# Patient Record
Sex: MALE | Race: WHITE | NOT HISPANIC OR LATINO | Employment: OTHER | URBAN - METROPOLITAN AREA
[De-identification: names, ages, dates, MRNs, and addresses within clinical notes are randomized per-mention and may not be internally consistent; named-entity substitution may affect disease eponyms.]

---

## 2017-02-14 ENCOUNTER — CLINICAL SUPPORT NO REQUIREMENTS (OUTPATIENT)
Dept: CARDIOLOGY | Facility: CLINIC | Age: 72
End: 2017-02-14

## 2017-02-14 DIAGNOSIS — I44.2 ATRIOVENTRICULAR BLOCK, COMPLETE (HCC): Primary | ICD-10-CM

## 2017-02-14 PROCEDURE — 93280 PM DEVICE PROGR EVAL DUAL: CPT | Performed by: INTERNAL MEDICINE

## 2017-04-02 DIAGNOSIS — I10 ESSENTIAL HYPERTENSION: ICD-10-CM

## 2017-04-03 RX ORDER — ATENOLOL 25 MG/1
TABLET ORAL
Qty: 30 TABLET | Refills: 0 | Status: SHIPPED | OUTPATIENT
Start: 2017-04-03 | End: 2017-05-05 | Stop reason: SDUPTHER

## 2017-05-05 DIAGNOSIS — I10 ESSENTIAL HYPERTENSION: ICD-10-CM

## 2017-05-05 RX ORDER — ATENOLOL 25 MG/1
TABLET ORAL
Qty: 30 TABLET | Refills: 0 | Status: SHIPPED | OUTPATIENT
Start: 2017-05-05 | End: 2017-05-09 | Stop reason: SDUPTHER

## 2017-05-08 ENCOUNTER — TELEPHONE (OUTPATIENT)
Dept: CARDIOLOGY | Facility: CLINIC | Age: 72
End: 2017-05-08

## 2017-05-09 RX ORDER — ATENOLOL 25 MG/1
TABLET ORAL
Qty: 30 TABLET | Refills: 0 | Status: SHIPPED | OUTPATIENT
Start: 2017-05-09 | End: 2017-05-30 | Stop reason: SDUPTHER

## 2017-05-18 ENCOUNTER — CLINICAL SUPPORT NO REQUIREMENTS (OUTPATIENT)
Dept: CARDIOLOGY | Facility: CLINIC | Age: 72
End: 2017-05-18

## 2017-05-18 DIAGNOSIS — I44.2 ATRIOVENTRICULAR BLOCK, COMPLETE (HCC): Primary | ICD-10-CM

## 2017-05-18 PROCEDURE — 93293 PM PHONE R-STRIP DEVICE EVAL: CPT | Performed by: INTERNAL MEDICINE

## 2017-05-30 ENCOUNTER — OFFICE VISIT (OUTPATIENT)
Dept: CARDIOLOGY | Facility: CLINIC | Age: 72
End: 2017-05-30

## 2017-05-30 VITALS
BODY MASS INDEX: 33.72 KG/M2 | DIASTOLIC BLOOD PRESSURE: 82 MMHG | WEIGHT: 249 LBS | HEIGHT: 72 IN | HEART RATE: 60 BPM | SYSTOLIC BLOOD PRESSURE: 136 MMHG

## 2017-05-30 DIAGNOSIS — I10 ESSENTIAL HYPERTENSION: ICD-10-CM

## 2017-05-30 DIAGNOSIS — R06.09 DYSPNEA ON EXERTION: ICD-10-CM

## 2017-05-30 DIAGNOSIS — I44.2 COMPLETE ATRIOVENTRICULAR BLOCK (HCC): Primary | ICD-10-CM

## 2017-05-30 PROCEDURE — 99213 OFFICE O/P EST LOW 20 MIN: CPT | Performed by: INTERNAL MEDICINE

## 2017-05-30 PROCEDURE — 93000 ELECTROCARDIOGRAM COMPLETE: CPT | Performed by: INTERNAL MEDICINE

## 2017-05-30 RX ORDER — ATENOLOL 25 MG/1
TABLET ORAL
Qty: 90 TABLET | Refills: 3 | Status: SHIPPED | OUTPATIENT
Start: 2017-05-30 | End: 2018-05-22 | Stop reason: SDUPTHER

## 2017-08-29 ENCOUNTER — CLINICAL SUPPORT NO REQUIREMENTS (OUTPATIENT)
Dept: CARDIOLOGY | Facility: CLINIC | Age: 72
End: 2017-08-29

## 2017-08-29 DIAGNOSIS — I44.2 ATRIOVENTRICULAR BLOCK, COMPLETE (HCC): Primary | ICD-10-CM

## 2017-08-29 PROCEDURE — 93280 PM DEVICE PROGR EVAL DUAL: CPT | Performed by: INTERNAL MEDICINE

## 2017-09-10 ENCOUNTER — HOSPITAL ENCOUNTER (EMERGENCY)
Facility: HOSPITAL | Age: 72
Discharge: HOME | End: 2017-09-10
Attending: EMERGENCY MEDICINE | Admitting: EMERGENCY MEDICINE

## 2017-09-10 ENCOUNTER — APPOINTMENT (OUTPATIENT)
Dept: GENERAL RADIOLOGY | Facility: HOSPITAL | Age: 72
End: 2017-09-10

## 2017-09-10 ENCOUNTER — TRANSCRIBE ORDERS (OUTPATIENT)
Dept: ADMINISTRATIVE | Facility: HOSPITAL | Age: 72
End: 2017-09-10

## 2017-09-10 VITALS
HEART RATE: 72 BPM | TEMPERATURE: 98.6 F | RESPIRATION RATE: 14 BRPM | HEIGHT: 72 IN | OXYGEN SATURATION: 95 % | SYSTOLIC BLOOD PRESSURE: 164 MMHG | WEIGHT: 230 LBS | DIASTOLIC BLOOD PRESSURE: 88 MMHG | BODY MASS INDEX: 31.15 KG/M2

## 2017-09-10 DIAGNOSIS — R11.0 NAUSEA: ICD-10-CM

## 2017-09-10 DIAGNOSIS — K80.50 BILIARY COLIC: Primary | ICD-10-CM

## 2017-09-10 LAB
ALBUMIN SERPL-MCNC: 4.2 G/DL (ref 3.5–5.2)
ALBUMIN/GLOB SERPL: 1.2 G/DL
ALP SERPL-CCNC: 49 U/L (ref 40–129)
ALT SERPL W P-5'-P-CCNC: 18 U/L (ref 5–41)
ANION GAP SERPL CALCULATED.3IONS-SCNC: 15.6 MMOL/L
AST SERPL-CCNC: 34 U/L (ref 5–40)
BACTERIA UR QL AUTO: ABNORMAL /HPF
BASOPHILS # BLD AUTO: 0.09 10*3/MM3 (ref 0–0.2)
BASOPHILS NFR BLD AUTO: 0.8 % (ref 0–2)
BILIRUB SERPL-MCNC: 1.2 MG/DL (ref 0.2–1.2)
BILIRUB UR QL STRIP: NEGATIVE
BUN BLD-MCNC: 28 MG/DL (ref 8–23)
BUN/CREAT SERPL: 28.6 (ref 7–25)
CALCIUM SPEC-SCNC: 9.3 MG/DL (ref 8.8–10.5)
CHLORIDE SERPL-SCNC: 93 MMOL/L (ref 98–107)
CLARITY UR: CLEAR
CO2 SERPL-SCNC: 26.4 MMOL/L (ref 22–29)
COLOR UR: YELLOW
CREAT BLD-MCNC: 0.98 MG/DL (ref 0.76–1.27)
DEPRECATED RDW RBC AUTO: 44.1 FL (ref 37–54)
EOSINOPHIL # BLD AUTO: 0.06 10*3/MM3 (ref 0.1–0.3)
EOSINOPHIL NFR BLD AUTO: 0.5 % (ref 0–4)
ERYTHROCYTE [DISTWIDTH] IN BLOOD BY AUTOMATED COUNT: 13.2 % (ref 11.5–14.5)
GFR SERPL CREATININE-BSD FRML MDRD: 75 ML/MIN/1.73
GLOBULIN UR ELPH-MCNC: 3.5 GM/DL
GLUCOSE BLD-MCNC: 98 MG/DL (ref 65–99)
GLUCOSE UR STRIP-MCNC: NEGATIVE MG/DL
HCT VFR BLD AUTO: 53.8 % (ref 42–52)
HGB BLD-MCNC: 18.3 G/DL (ref 14–18)
HGB UR QL STRIP.AUTO: ABNORMAL
HYALINE CASTS UR QL AUTO: ABNORMAL /LPF
IMM GRANULOCYTES # BLD: 0.06 10*3/MM3 (ref 0–0.03)
IMM GRANULOCYTES NFR BLD: 0.5 % (ref 0–0.5)
KETONES UR QL STRIP: ABNORMAL
LEUKOCYTE ESTERASE UR QL STRIP.AUTO: NEGATIVE
LIPASE SERPL-CCNC: 54 U/L (ref 13–60)
LYMPHOCYTES # BLD AUTO: 2.56 10*3/MM3 (ref 0.6–4.8)
LYMPHOCYTES NFR BLD AUTO: 21.6 % (ref 20–45)
MCH RBC QN AUTO: 30.7 PG (ref 27–31)
MCHC RBC AUTO-ENTMCNC: 34 G/DL (ref 31–37)
MCV RBC AUTO: 90.3 FL (ref 80–94)
MONOCYTES # BLD AUTO: 1.2 10*3/MM3 (ref 0–1)
MONOCYTES NFR BLD AUTO: 10.1 % (ref 3–8)
NEUTROPHILS # BLD AUTO: 7.9 10*3/MM3 (ref 1.5–8.3)
NEUTROPHILS NFR BLD AUTO: 66.5 % (ref 45–70)
NITRITE UR QL STRIP: NEGATIVE
NRBC BLD MANUAL-RTO: 0 /100 WBC (ref 0–0)
PH UR STRIP.AUTO: 5.5 [PH] (ref 4.5–8)
PLATELET # BLD AUTO: 262 10*3/MM3 (ref 140–500)
PMV BLD AUTO: 10 FL (ref 7.4–10.4)
POTASSIUM BLD-SCNC: 4 MMOL/L (ref 3.5–5.2)
PROT SERPL-MCNC: 7.7 G/DL (ref 6–8.5)
PROT UR QL STRIP: NEGATIVE
RBC # BLD AUTO: 5.96 10*6/MM3 (ref 4.7–6.1)
RBC # UR: ABNORMAL /HPF
REF LAB TEST METHOD: ABNORMAL
SODIUM BLD-SCNC: 135 MMOL/L (ref 136–145)
SP GR UR STRIP: <=1.005 (ref 1–1.03)
SQUAMOUS #/AREA URNS HPF: ABNORMAL /HPF
TROPONIN T SERPL-MCNC: <0.01 NG/ML (ref 0–0.03)
UROBILINOGEN UR QL STRIP: ABNORMAL
WBC NRBC COR # BLD: 11.87 10*3/MM3 (ref 4.8–10.8)
WBC UR QL AUTO: ABNORMAL /HPF

## 2017-09-10 PROCEDURE — 96374 THER/PROPH/DIAG INJ IV PUSH: CPT

## 2017-09-10 PROCEDURE — 85025 COMPLETE CBC W/AUTO DIFF WBC: CPT | Performed by: EMERGENCY MEDICINE

## 2017-09-10 PROCEDURE — 99284 EMERGENCY DEPT VISIT MOD MDM: CPT

## 2017-09-10 PROCEDURE — 25010000002 ONDANSETRON PER 1 MG: Performed by: EMERGENCY MEDICINE

## 2017-09-10 PROCEDURE — 99284 EMERGENCY DEPT VISIT MOD MDM: CPT | Performed by: EMERGENCY MEDICINE

## 2017-09-10 PROCEDURE — 81001 URINALYSIS AUTO W/SCOPE: CPT | Performed by: EMERGENCY MEDICINE

## 2017-09-10 PROCEDURE — 83690 ASSAY OF LIPASE: CPT | Performed by: EMERGENCY MEDICINE

## 2017-09-10 PROCEDURE — 80053 COMPREHEN METABOLIC PANEL: CPT | Performed by: EMERGENCY MEDICINE

## 2017-09-10 PROCEDURE — 71010 HC CHEST PA OR AP: CPT

## 2017-09-10 PROCEDURE — 93005 ELECTROCARDIOGRAM TRACING: CPT | Performed by: EMERGENCY MEDICINE

## 2017-09-10 PROCEDURE — 96361 HYDRATE IV INFUSION ADD-ON: CPT

## 2017-09-10 PROCEDURE — 93010 ELECTROCARDIOGRAM REPORT: CPT | Performed by: INTERNAL MEDICINE

## 2017-09-10 PROCEDURE — 84484 ASSAY OF TROPONIN QUANT: CPT | Performed by: EMERGENCY MEDICINE

## 2017-09-10 RX ORDER — ASPIRIN 81 MG/1
81 TABLET, CHEWABLE ORAL DAILY
COMMUNITY

## 2017-09-10 RX ORDER — ONDANSETRON 2 MG/ML
4 INJECTION INTRAMUSCULAR; INTRAVENOUS ONCE
Status: COMPLETED | OUTPATIENT
Start: 2017-09-10 | End: 2017-09-10

## 2017-09-10 RX ORDER — ONDANSETRON 8 MG/1
8 TABLET, ORALLY DISINTEGRATING ORAL EVERY 8 HOURS PRN
Qty: 10 TABLET | Refills: 0 | Status: SHIPPED | OUTPATIENT
Start: 2017-09-10 | End: 2018-05-22 | Stop reason: ALTCHOICE

## 2017-09-10 RX ORDER — SUCRALFATE 1 G/1
1 TABLET ORAL ONCE
Status: COMPLETED | OUTPATIENT
Start: 2017-09-10 | End: 2017-09-10

## 2017-09-10 RX ORDER — SUCRALFATE ORAL 1 G/10ML
1 SUSPENSION ORAL 4 TIMES DAILY
Qty: 420 ML | Refills: 0 | Status: SHIPPED | OUTPATIENT
Start: 2017-09-10 | End: 2018-05-22 | Stop reason: ALTCHOICE

## 2017-09-10 RX ORDER — PROMETHAZINE HYDROCHLORIDE 25 MG/1
25 TABLET ORAL EVERY 6 HOURS PRN
COMMUNITY
End: 2018-05-22 | Stop reason: ALTCHOICE

## 2017-09-10 RX ADMIN — SODIUM CHLORIDE 1000 ML: 9 INJECTION, SOLUTION INTRAVENOUS at 21:21

## 2017-09-10 RX ADMIN — ONDANSETRON 4 MG: 2 INJECTION, SOLUTION INTRAMUSCULAR; INTRAVENOUS at 21:21

## 2017-09-10 RX ADMIN — SUCRALFATE 1 G: 1 TABLET ORAL at 23:46

## 2017-09-11 ENCOUNTER — APPOINTMENT (OUTPATIENT)
Dept: ULTRASOUND IMAGING | Facility: HOSPITAL | Age: 72
End: 2017-09-11
Attending: EMERGENCY MEDICINE

## 2017-09-11 NOTE — DISCHARGE INSTRUCTIONS
Avoid spicy and fatty foods.  Be here at 11:30 tomorrow morning for a gallbladder ultrasound at noon.  The results will be forwarded to Dr. Lynch.  Follow-up with physician from the physician referral list for primary care provider to follow-up with within one week.. Return to emergency department if there is increasing pain, vomiting, vomiting blood, fever, chills, worsen anyway at all.

## 2017-09-11 NOTE — ED PROVIDER NOTES
Subjective   History of Present Illness  History of Present Illness    Chief complaint: Nausea    Location: Home    Quality/Severity:  Moderate, vomiting ×1 after taking a pain pill.  The emesis was nonbloody and nonbilious.    Timing/Onset/Duration: Acute onset 48 hours ago    Modifying Factors: Nothing seems to make it better or worse    Associated Symptoms: No headache.  No fever chills or cough.  No sore throat earache or nasal congestion.  No chest pain or shortness of breath.  No abdominal pain.  No diarrhea or burning when he urinates.    Narrative: This 71-year-old white male presents with nausea for last 48 hours.  The patient states the nausea started Friday night after eating a steak, baked potato, Mandy Valdovinos, and bladder.  He patient denies any headache.  No fever chills or cough.  No sore throat earache or nasal condition.  No chest pain or shortness of breath.  The patient vomited once after taking a pain pill for his chronic back pain.  There is been no blood in the vomit and it is been nonbilious.  There is no point diarrhea or burning when he urinates.  The patient still has her gallbladder.  The patient has been complaining of intermittent right upper quadrant tenderness after he eats.    PCP:  Mi      Review of Systems   Constitutional: Negative for chills and fever.   HENT: Negative for ear pain and sore throat.    Eyes: Negative for discharge and redness.   Respiratory: Negative for cough, chest tightness, shortness of breath, wheezing and stridor.    Cardiovascular: Negative for chest pain, palpitations and leg swelling.   Gastrointestinal: Positive for nausea and vomiting. Negative for abdominal pain, blood in stool, constipation and diarrhea.   Genitourinary: Negative for decreased urine volume, dysuria, flank pain, frequency, hematuria and urgency.   Musculoskeletal: Positive for back pain ( chronic). Negative for arthralgias, neck pain and neck stiffness.   Skin: Negative for rash.    Neurological: Negative for dizziness, speech difficulty, weakness, light-headedness, numbness and headaches.   Hematological: Negative for adenopathy.   Psychiatric/Behavioral: Negative.  Negative for agitation and confusion.        Medication List      ASK your doctor about these medications          aspirin 81 MG chewable tablet       atenolol 25 MG tablet   Commonly known as:  TENORMIN   Take one tablet daily.       Hydrocodone-Acetaminophen  MG per tablet   Commonly known as:  LORTAB        promethazine 25 MG tablet   Commonly known as:  PHENERGAN           Past Medical History:   Diagnosis Date   • Chronic anxiety    • Chronic lower back pain    • Complete atrioventricular block     s/p dual chamber pacemaker   • Gout    • Hypertension        Allergies   Allergen Reactions   • No Known Drug Allergy        Past Surgical History:   Procedure Laterality Date   • INSERT / REPLACE / REMOVE PACEMAKER     • PACEMAKER IMPLANTATION      [employer] dual chamber, 11/2011       History reviewed. No pertinent family history.    Social History     Social History   • Marital status:      Spouse name: N/A   • Number of children: N/A   • Years of education: N/A     Social History Main Topics   • Smoking status: Former Smoker     Start date: 5/30/1997   • Smokeless tobacco: Never Used      Comment: CAFFEINE USE/ ONE CUP IN THE MORNING.    • Alcohol use Yes      Comment: SOCIAL USE    • Drug use: No   • Sexual activity: Not Asked     Other Topics Concern   • None     Social History Narrative           Objective   Physical Exam   Constitutional: He is oriented to person, place, and time. He appears well-developed and well-nourished. No distress.   ED Triage Vitals:  Temp: 98.6 °F (37 °C) (09/10/17 1846)  Heart Rate: 84 (09/10/17 1846)  Resp: 20 (09/10/17 1846)  BP: 111/80 (09/10/17 1846)  SpO2: 97 % (09/10/17 1846)  Temp src: Oral (09/10/17 1846)  Heart Rate Source: Monitor (09/10/17 2126)  Patient Position: Lying  (09/10/17 2126)  BP Location: Right arm (09/10/17 2126)  FiO2 (%): n/a    The patient's vitals were reviewed by me.  Unless otherwise noted they are within normal limits.     HENT:   Head: Normocephalic and atraumatic.   Right Ear: External ear normal.   Left Ear: External ear normal.   Nose: Nose normal.   Mouth/Throat: Oropharynx is clear and moist.   Eyes: Conjunctivae and EOM are normal. Pupils are equal, round, and reactive to light. Right eye exhibits no discharge. Left eye exhibits no discharge. No scleral icterus.   Neck: Normal range of motion. Neck supple. No JVD present. No tracheal deviation present. No thyromegaly present.   Cardiovascular: Normal rate, regular rhythm, normal heart sounds and intact distal pulses.  Exam reveals no gallop and no friction rub.    No murmur heard.  Pulmonary/Chest: Effort normal and breath sounds normal. No stridor. No respiratory distress. He has no wheezes. He has no rales. He exhibits no tenderness.   Abdominal: Soft. Bowel sounds are normal. He exhibits no distension and no mass. There is no tenderness. There is no rebound and no guarding. No hernia.   Musculoskeletal: Normal range of motion. He exhibits no edema or deformity.   Lymphadenopathy:     He has no cervical adenopathy.   Neurological: He is alert and oriented to person, place, and time.   Skin: Skin is warm and dry. No rash noted. He is not diaphoretic. No erythema. No pallor.   Psychiatric: His behavior is normal.   Nursing note and vitals reviewed.      Procedures         ED Course  ED Course   Comment By Time   Laboratory values were reviewed by me.  Urine microscopic shows 0-2 RBCs.  The white blood cell count is 11.8.  The hemoglobin is 18.  The hematocrit is 53.  The BUN is 28.  The sodium is 135.  The chloride is 93.  The lab for values are otherwise unremarkable. Panchito Bae MD 09/10 2316      11:18 PM, 09/10/17:  The EKG was obtained at 20/1/17.  The EKG was read by me at 20/1/18.  EKG shows  an atrial sensed ventricular paced complexes.  The rate is 72.  There is no other acute abnormality.The EKG tonight was compared to an EKG dated May 30, 2017.  The EKG is essentially unchanged.    11:27 PM, 09/10/17:  The patient was reassessed.  He feels better.  His vital signs were reviewed and are stable.  Abdominal exam: Soft nontender no masses positive bowel sounds.  The patient tolerated clear liquids.    11:27 PM, 09/10/17:  The patient's diagnosis of nausea and biliary colic was discussed with him.  The patient has been advised to eat a non-fat non-spicy diet.  We will obtain a gallbladder ultrasound on the patient tomorrow with the results forwarded to Dr. Lynch.  Patient has been advised to follow-up with Dr. Mcpherson this week after gallbladder ultrasound.  He is requesting to have a new primary care provider.  We will give him a list of primary care providers that he can follow-up with within the next week.  All the patient's questions were answered patient will be discharged in good condition.        MDM  XR Chest 1 View   ED Interpretation   The chest x-ray was compared to previously reviewed by me.  There   is no active disease.        Labs Reviewed   COMPREHENSIVE METABOLIC PANEL - Abnormal; Notable for the following:        Result Value    BUN 28 (*)     Sodium 135 (*)     Chloride 93 (*)     BUN/Creatinine Ratio 28.6 (*)     All other components within normal limits    Narrative:     The MDRD GFR formula is only valid for adults with stable renal function between ages 18 and 70.   URINALYSIS W/ CULTURE IF INDICATED - Abnormal; Notable for the following:     Ketones, UA Trace (*)     Blood, UA Trace (*)     All other components within normal limits   CBC WITH AUTO DIFFERENTIAL - Abnormal; Notable for the following:     WBC 11.87 (*)     Hemoglobin 18.3 (*)     Hematocrit 53.8 (*)     Monocyte % 10.1 (*)     Monocytes, Absolute 1.20 (*)     Eosinophils, Absolute 0.06 (*)     Immature Grans, Absolute  0.06 (*)     All other components within normal limits   URINALYSIS, MICROSCOPIC ONLY - Abnormal; Notable for the following:     RBC, UA 0-2 (*)     All other components within normal limits   TROPONIN (IN-HOUSE) - Normal    Narrative:     Troponin T Reference Ranges:  Less than 0.03 ng/mL:    Negative for AMI  0.03 to 0.09 ng/mL:      Indeterminant for AMI  Greater than 0.09 ng/mL: Positive for AMI   LIPASE - Normal   CBC AND DIFFERENTIAL    Narrative:     The following orders were created for panel order CBC & Differential.  Procedure                               Abnormality         Status                     ---------                               -----------         ------                     CBC Auto Differential[[id number]]        Abnormal            Final result                 Please view results for these tests on the individual orders.         Final diagnoses:   None         ED Medications:  Medications   ondansetron (ZOFRAN) injection 4 mg (4 mg Intravenous Given 9/10/17 2121)   sodium chloride 0.9 % bolus 1,000 mL (0 mL Intravenous Stopped 9/10/17 2219)       New Medications:     Medication List      ASK your doctor about these medications          aspirin 81 MG chewable tablet       atenolol 25 MG tablet   Commonly known as:  TENORMIN   Take one tablet daily.       Hydrocodone-Acetaminophen  MG per tablet   Commonly known as:  LORTAB        promethazine 25 MG tablet   Commonly known as:  PHENERGAN           Stopped Medications:     Medication List      ASK your doctor about these medications          aspirin 81 MG chewable tablet       atenolol 25 MG tablet   Commonly known as:  TENORMIN   Take one tablet daily.       Hydrocodone-Acetaminophen  MG per tablet   Commonly known as:  LORTAB        promethazine 25 MG tablet   Commonly known as:  PHENERGAN             Final diagnoses:   Biliary colic   Nausea            Panchito Bae MD  09/10/17 9302

## 2017-09-14 ENCOUNTER — HOSPITAL ENCOUNTER (OUTPATIENT)
Dept: ULTRASOUND IMAGING | Facility: HOSPITAL | Age: 72
Discharge: HOME | End: 2017-09-14
Attending: EMERGENCY MEDICINE | Admitting: EMERGENCY MEDICINE

## 2017-09-14 PROCEDURE — 76705 ECHO EXAM OF ABDOMEN: CPT

## 2017-10-10 ENCOUNTER — TRANSCRIBE ORDERS (OUTPATIENT)
Dept: ADMINISTRATIVE | Facility: HOSPITAL | Age: 72
End: 2017-10-10

## 2017-10-10 DIAGNOSIS — R10.11 RUQ ABDOMINAL PAIN: Primary | ICD-10-CM

## 2017-10-12 ENCOUNTER — HOSPITAL ENCOUNTER (OUTPATIENT)
Dept: NUCLEAR MEDICINE | Facility: HOSPITAL | Age: 72
Discharge: HOME | End: 2017-10-12

## 2017-10-12 DIAGNOSIS — R10.11 RUQ ABDOMINAL PAIN: ICD-10-CM

## 2017-10-12 PROCEDURE — 78227 HEPATOBIL SYST IMAGE W/DRUG: CPT

## 2017-10-12 PROCEDURE — 0 TECHNETIUM TC 99M MEBROFENIN KIT: Performed by: FAMILY MEDICINE

## 2017-10-12 PROCEDURE — A9537 TC99M MEBROFENIN: HCPCS | Performed by: FAMILY MEDICINE

## 2017-10-12 PROCEDURE — 25010000002 SINCALIDE PER 5 MCG: Performed by: FAMILY MEDICINE

## 2017-10-12 RX ORDER — KIT FOR THE PREPARATION OF TECHNETIUM TC 99M MEBROFENIN 45 MG/10ML
1 INJECTION, POWDER, LYOPHILIZED, FOR SOLUTION INTRAVENOUS
Status: COMPLETED | OUTPATIENT
Start: 2017-10-12 | End: 2017-10-12

## 2017-10-12 RX ADMIN — MEBROFENIN 1 DOSE: 45 INJECTION, POWDER, LYOPHILIZED, FOR SOLUTION INTRAVENOUS at 13:45

## 2017-10-12 RX ADMIN — SINCALIDE 2.2 MCG: 5 INJECTION, POWDER, LYOPHILIZED, FOR SOLUTION INTRAVENOUS at 14:30

## 2017-11-30 ENCOUNTER — CLINICAL SUPPORT NO REQUIREMENTS (OUTPATIENT)
Dept: CARDIOLOGY | Facility: CLINIC | Age: 72
End: 2017-11-30

## 2017-11-30 DIAGNOSIS — I44.2 ATRIOVENTRICULAR BLOCK, COMPLETE (HCC): Primary | ICD-10-CM

## 2017-11-30 PROCEDURE — 93293 PM PHONE R-STRIP DEVICE EVAL: CPT | Performed by: INTERNAL MEDICINE

## 2018-01-09 ENCOUNTER — HOSPITAL ENCOUNTER (OUTPATIENT)
Dept: GENERAL RADIOLOGY | Facility: HOSPITAL | Age: 73
Discharge: HOME | End: 2018-01-09
Admitting: PHYSICIAN ASSISTANT

## 2018-01-09 ENCOUNTER — HOSPITAL ENCOUNTER (OUTPATIENT)
Dept: GENERAL RADIOLOGY | Facility: HOSPITAL | Age: 73
Discharge: HOME | End: 2018-01-09

## 2018-01-09 ENCOUNTER — TRANSCRIBE ORDERS (OUTPATIENT)
Dept: ADMINISTRATIVE | Facility: HOSPITAL | Age: 73
End: 2018-01-09

## 2018-01-09 DIAGNOSIS — M25.551 PAIN OF BOTH HIP JOINTS: ICD-10-CM

## 2018-01-09 DIAGNOSIS — M51.36 DDD (DEGENERATIVE DISC DISEASE), LUMBAR: ICD-10-CM

## 2018-01-09 DIAGNOSIS — M25.552 PAIN OF BOTH HIP JOINTS: Primary | ICD-10-CM

## 2018-01-09 DIAGNOSIS — M25.552 PAIN OF BOTH HIP JOINTS: ICD-10-CM

## 2018-01-09 DIAGNOSIS — M25.551 PAIN OF BOTH HIP JOINTS: Primary | ICD-10-CM

## 2018-01-09 PROCEDURE — 73522 X-RAY EXAM HIPS BI 3-4 VIEWS: CPT

## 2018-01-09 PROCEDURE — 72114 X-RAY EXAM L-S SPINE BENDING: CPT

## 2018-02-06 ENCOUNTER — CLINICAL SUPPORT NO REQUIREMENTS (OUTPATIENT)
Dept: CARDIOLOGY | Facility: CLINIC | Age: 73
End: 2018-02-06

## 2018-02-06 DIAGNOSIS — I44.2 THIRD DEGREE AV BLOCK (HCC): Primary | ICD-10-CM

## 2018-02-06 PROCEDURE — 93280 PM DEVICE PROGR EVAL DUAL: CPT | Performed by: INTERNAL MEDICINE

## 2018-05-03 ENCOUNTER — CLINICAL SUPPORT NO REQUIREMENTS (OUTPATIENT)
Dept: CARDIOLOGY | Facility: CLINIC | Age: 73
End: 2018-05-03

## 2018-05-03 DIAGNOSIS — I44.2 COMPLETE HEART BLOCK (HCC): Primary | ICD-10-CM

## 2018-05-03 PROCEDURE — 93293 PM PHONE R-STRIP DEVICE EVAL: CPT | Performed by: INTERNAL MEDICINE

## 2018-05-22 ENCOUNTER — OFFICE VISIT (OUTPATIENT)
Dept: CARDIOLOGY | Facility: CLINIC | Age: 73
End: 2018-05-22

## 2018-05-22 ENCOUNTER — TRANSCRIBE ORDERS (OUTPATIENT)
Dept: ADMINISTRATIVE | Facility: HOSPITAL | Age: 73
End: 2018-05-22

## 2018-05-22 VITALS
HEIGHT: 72 IN | HEART RATE: 62 BPM | WEIGHT: 246 LBS | BODY MASS INDEX: 33.32 KG/M2 | SYSTOLIC BLOOD PRESSURE: 130 MMHG | DIASTOLIC BLOOD PRESSURE: 88 MMHG

## 2018-05-22 DIAGNOSIS — M51.36 DDD (DEGENERATIVE DISC DISEASE), LUMBAR: Primary | ICD-10-CM

## 2018-05-22 DIAGNOSIS — I10 ESSENTIAL HYPERTENSION: ICD-10-CM

## 2018-05-22 DIAGNOSIS — I44.2 COMPLETE ATRIOVENTRICULAR BLOCK (HCC): Primary | ICD-10-CM

## 2018-05-22 DIAGNOSIS — R06.02 SHORTNESS OF BREATH: ICD-10-CM

## 2018-05-22 DIAGNOSIS — Z95.0 HISTORY OF PACEMAKER: ICD-10-CM

## 2018-05-22 PROCEDURE — 99213 OFFICE O/P EST LOW 20 MIN: CPT | Performed by: INTERNAL MEDICINE

## 2018-05-22 RX ORDER — OMEPRAZOLE 20 MG/1
20 CAPSULE, DELAYED RELEASE ORAL DAILY
COMMUNITY

## 2018-05-22 RX ORDER — ATENOLOL 25 MG/1
TABLET ORAL
Qty: 90 TABLET | Refills: 3 | Status: SHIPPED | OUTPATIENT
Start: 2018-05-22 | End: 2019-07-23

## 2018-05-22 NOTE — PROGRESS NOTES
Date of Office Visit: 2018  Encounter Provider: Reji Alarcon MD  Place of Service: [facility] CARDIOLOGY  Patient Name: Felipe Lucas  :1945    Chief Complaint   Patient presents with   • Hypertension     1 yr follow up    :     HPI: Felipe Lucas is a 72 y.o. male who presents today to follow-up.      He has a history of complete AV block and syncope and had a pacemaker implanted  (dual-chamber [employer]). He has had no events since then and we have been checking his pacemaker in our device clinic in [place].      When I saw him in 2015, he noted significant shortness of breath with exertion. I recommended a nuclear stress test and an echocardiogram but he declined due to cost. When I saw him again in 2016, he again noted these symptoms.  I again recommended testing but it was declined due to cost.  I did start atenolol at that time due to severe hypertension and anxiety.  This has helped some, but he continues to have exertional dyspnea. He denies paroxysmal nocturnal dyspnea, orthopnea, edema, chest discomfort, or palpitations.     Past Medical History:   Diagnosis Date   • Chronic anxiety    • Chronic lower back pain    • Complete atrioventricular block     s/p dual chamber pacemaker   • Gout    • Hypertension        Past Surgical History:   Procedure Laterality Date   • INSERT / REPLACE / REMOVE PACEMAKER     • MULTIPLE TOOTH EXTRACTIONS     • PACEMAKER IMPLANTATION      [employer] dual chamber, 2011       Social History     Social History   • Marital status:      Spouse name: N/A   • Number of children: N/A   • Years of education: N/A     Occupational History   • Not on file.     Social History Main Topics   • Smoking status: Former Smoker     Start date: 1997   • Smokeless tobacco: Never Used      Comment: CAFFEINE USE/ ONE CUP IN THE MORNING.    • Alcohol use Yes      Comment: SOCIAL USE    • Drug use: No   • Sexual  "activity: Not on file     Other Topics Concern   • Not on file     Social History Narrative   • No narrative on file       History reviewed. No pertinent family history.    Review of Systems   Constitution: Positive for malaise/fatigue.   Cardiovascular: Positive for dyspnea on exertion. Negative for chest pain and palpitations.   Musculoskeletal: Positive for back pain, joint pain and joint swelling.   Neurological: Negative for dizziness and light-headedness.   All other systems reviewed and are negative.      Allergies   Allergen Reactions   • Sertraline Other (See Comments)     Makes heart race         Current Outpatient Prescriptions:   •  aspirin 81 MG chewable tablet, Chew 81 mg Daily., Disp: , Rfl:   •  atenolol (TENORMIN) 25 MG tablet, Take one tablet daily., Disp: 90 tablet, Rfl: 3  •  Hydrocodone-Acetaminophen (LORTAB )  MG per tablet, Take  by mouth 2 (two) times a day., Disp: , Rfl:   •  omeprazole (priLOSEC) 20 MG capsule, Take 20 mg by mouth Daily., Disp: , Rfl:      Objective:     Vitals:    05/22/18 1318   BP: 130/88   BP Location: Right arm   Pulse: 62   Weight: 112 kg (246 lb)   Height: 182.9 cm (72\")     Body mass index is 33.36 kg/m².    Physical Exam   Constitutional: He is oriented to person, place, and time.   Obese   HENT:   Head: Normocephalic.   Nose: Nose normal.   Mouth/Throat: Oropharynx is clear and moist.   Eyes: Conjunctivae and EOM are normal. Pupils are equal, round, and reactive to light.   Neck: Normal range of motion.   Cannot assess for JVD due to habitus   Cardiovascular: Normal rate, regular rhythm, normal heart sounds and intact distal pulses.    No murmur heard.  Pulmonary/Chest: Effort normal.   Abdominal: Soft.   Obesity limits abdominal exam   Musculoskeletal: Normal range of motion. He exhibits no edema.   Neurological: He is alert and oriented to person, place, and time. No cranial nerve deficit.   Skin: Skin is warm and dry. No rash noted.   Psychiatric: " He has a normal mood and affect. His behavior is normal. Judgment and thought content normal.   Vitals reviewed.      Procedures      Assessment:       Diagnosis Plan   1. Complete atrioventricular block     2. History of pacemaker     3. Essential hypertension     4. Shortness of breath            Plan:       1/2.  He has a history of complete heart block s/p PPM.  We follow this in our office and there have been no issues.    3.  His BP is within goal on atenolol (I chose a beta blocker due to concomitant anxiety).    4.  His exertional dyspnea is quite concerning to me, but it appears stable over the last three years. He has declined cardiac testing due to cost.     Sincerely,       Reji Alarcon MD

## 2018-06-05 ENCOUNTER — HOSPITAL ENCOUNTER (OUTPATIENT)
Dept: CT IMAGING | Facility: HOSPITAL | Age: 73
Discharge: HOME | End: 2018-06-05
Admitting: PHYSICIAN ASSISTANT

## 2018-06-05 DIAGNOSIS — M51.36 DDD (DEGENERATIVE DISC DISEASE), LUMBAR: ICD-10-CM

## 2018-06-05 PROCEDURE — 72131 CT LUMBAR SPINE W/O DYE: CPT

## 2018-08-14 ENCOUNTER — CLINICAL SUPPORT NO REQUIREMENTS (OUTPATIENT)
Dept: CARDIOLOGY | Facility: CLINIC | Age: 73
End: 2018-08-14

## 2018-08-14 DIAGNOSIS — I44.2 THIRD DEGREE AV BLOCK (HCC): Primary | ICD-10-CM

## 2018-08-14 PROCEDURE — 93280 PM DEVICE PROGR EVAL DUAL: CPT | Performed by: INTERNAL MEDICINE

## 2018-11-29 ENCOUNTER — CLINICAL SUPPORT NO REQUIREMENTS (OUTPATIENT)
Dept: CARDIOLOGY | Facility: CLINIC | Age: 73
End: 2018-11-29

## 2018-11-29 DIAGNOSIS — I44.2 COMPLETE HEART BLOCK (HCC): Primary | ICD-10-CM

## 2018-11-29 PROCEDURE — 93293 PM PHONE R-STRIP DEVICE EVAL: CPT | Performed by: INTERNAL MEDICINE

## 2019-03-08 ENCOUNTER — HOSPITAL ENCOUNTER (EMERGENCY)
Facility: HOSPITAL | Age: 74
Discharge: HOME | End: 2019-03-08
Attending: EMERGENCY MEDICINE | Admitting: EMERGENCY MEDICINE

## 2019-03-08 VITALS
HEART RATE: 67 BPM | HEIGHT: 72 IN | TEMPERATURE: 97.8 F | OXYGEN SATURATION: 95 % | BODY MASS INDEX: 33.86 KG/M2 | SYSTOLIC BLOOD PRESSURE: 183 MMHG | RESPIRATION RATE: 18 BRPM | DIASTOLIC BLOOD PRESSURE: 96 MMHG | WEIGHT: 250 LBS

## 2019-03-08 DIAGNOSIS — H81.399 PERIPHERAL VERTIGO, UNSPECIFIED LATERALITY: Primary | ICD-10-CM

## 2019-03-08 PROCEDURE — 99281 EMR DPT VST MAYX REQ PHY/QHP: CPT | Performed by: EMERGENCY MEDICINE

## 2019-03-08 PROCEDURE — 99283 EMERGENCY DEPT VISIT LOW MDM: CPT

## 2019-03-08 RX ORDER — MECLIZINE HYDROCHLORIDE 25 MG/1
50 TABLET ORAL EVERY 6 HOURS PRN
Qty: 30 TABLET | Refills: 0 | Status: SHIPPED | OUTPATIENT
Start: 2019-03-08 | End: 2019-03-21 | Stop reason: HOSPADM

## 2019-03-08 RX ORDER — MECLIZINE HYDROCHLORIDE 25 MG/1
50 TABLET ORAL ONCE
Status: COMPLETED | OUTPATIENT
Start: 2019-03-08 | End: 2019-03-08

## 2019-03-08 RX ADMIN — MECLIZINE HYDROCHLORIDE 50 MG: 25 TABLET ORAL at 16:09

## 2019-03-08 NOTE — ED PROVIDER NOTES
EMERGENCY DEPARTMENT ENCOUNTER    CHIEF COMPLAINT  Chief Complaint: Dizziness  Room Number: D/D  PMD: Boyd Andres MD      HPI:  Pt is a 73 y.o. male who presents complaining of dizziness for several days.  Intermittent spells last about 10-20 minutes.  Seem to be worse when bending over.  Not really dizzy now.  Pt has been taking Cefidinir off and on for last 6 weeks (for dental infxn) and thinks pills might have caused this.  Also reports having recent URI (now better) about 1 week ago.      Duration/Timing: intermittent lasting 10-20 min  Location: NA  Quality: spinning sensation  Intensity/Severity: moderate-severe at worst, mostly gone now  Associated Symptoms: Nausea with dizziness  Aggravating Factors or Alleviating Factors: Worse with bending over  Previous Episodes: none  Treatment before arrival: none    PAST MEDICAL HISTORY  Active Ambulatory Problems     Diagnosis Date Noted   • Hypertension    • Complete atrioventricular block (CMS/HCC)    • History of pacemaker 05/22/2018     Resolved Ambulatory Problems     Diagnosis Date Noted   • No Resolved Ambulatory Problems     Past Medical History:   Diagnosis Date   • Chronic anxiety    • Chronic lower back pain    • Complete atrioventricular block (CMS/HCC)    • Gout    • Hypertension        PAST SURGICAL HISTORY  Past Surgical History:   Procedure Laterality Date   • INSERT / REPLACE / REMOVE PACEMAKER     • MULTIPLE TOOTH EXTRACTIONS     • PACEMAKER IMPLANTATION      [employer] dual chamber, 11/2011       FAMILY HISTORY  History reviewed. No pertinent family history.    SOCIAL HISTORY  Social History     Socioeconomic History   • Marital status:      Spouse name: Not on file   • Number of children: Not on file   • Years of education: Not on file   • Highest education level: Not on file   Social Needs   • Financial resource strain: Not on file   • Food insecurity - worry: Not on file   • Food insecurity - inability: Not on file   • Transportation  needs - medical: Not on file   • Transportation needs - non-medical: Not on file   Occupational History   • Not on file   Tobacco Use   • Smoking status: Former Smoker     Start date: 5/30/1997   • Smokeless tobacco: Never Used   • Tobacco comment: CAFFEINE USE/ ONE CUP IN THE MORNING.    Substance and Sexual Activity   • Alcohol use: Yes     Comment: SOCIAL USE    • Drug use: No   • Sexual activity: Not on file   Other Topics Concern   • Not on file   Social History Narrative   • Not on file       ALLERGIES  Sertraline    REVIEW OF SYSTEMS  Review of Systems   Constitutional: Negative.  Negative for fever.   HENT: Negative.  Negative for ear pain and sore throat.    Eyes: Negative.    Respiratory: Negative.  Negative for cough.    Cardiovascular: Negative.  Negative for chest pain.   Gastrointestinal: Negative.    Genitourinary: Negative.  Negative for dysuria.   Musculoskeletal: Negative.  Negative for back pain.   Skin: Negative.  Negative for rash.   Neurological: Positive for dizziness. Negative for headaches.   All other systems reviewed and are negative.      PHYSICAL EXAM  ED Triage Vitals [03/08/19 1528]   Temp Heart Rate Resp BP SpO2   97.8 °F (36.6 °C) 67 18 (!) 183/96 95 %      Temp src Heart Rate Source Patient Position BP Location FiO2 (%)   Oral Monitor Sitting Right arm --       Physical Exam   Neurological: He has a normal Cerebellar Exam, a normal Finger-Nose-Finger Test, a normal Heel to Shin Test, a normal Romberg Test and a normal Tandem Gait Test. He shows no pronator drift.   Neg Buffalo Hallpike  Neg HIT        LAB RESULTS  No results found for this or any previous visit (from the past 24 hour(s)).    I ordered the above labs and reviewed the results    RADIOLOGY  No orders to display          PROCEDURES  Procedures      PROGRESS AND CONSULTS     1600 - Unable to reproduce symptoms/nystagmus with Buffalo-Hallpike or with HIT testing.  Cerebellar testing normal.  I suspect peripheral vertigo (some  elements of both BPPV and vest neuritis).  Not really suggestive of cerebellar TIA.  Would go ahead and give a dose of Meclizine and a Rx for same to take PRN.  Recommend FU with PCP if not improving - return to ER for worsening Sx.      MEDICAL DECISION MAKING  Results were reviewed/discussed with the patient and they were also made aware of online access. Pt also made aware that some labs, such as cultures, will not be resulted during ER visit and follow up with PMD is necessary.     MDM       DIAGNOSIS  Final diagnoses:   Peripheral vertigo, unspecified laterality       DISPOSITION  DISCHARGE    Patient discharged in stable condition.    Reviewed implications of results, diagnosis, meds, responsibility to follow up, warning signs and symptoms of possible worsening, potential complications and reasons to return to ER, including increased dizziness or as needed.    Patient/Family voiced understanding of above instructions.    Discussed plan for discharge, as there is no emergent indication for admission. Patient referred to primary care provider for BP management due to today's BP. Pt/family is agreeable and understands need for follow up and repeat testing.  Pt is aware that discharge does not mean that nothing is wrong but it indicates no emergency is present that requires admission and they must continue care with follow-up as given below or physician of their choice.     FOLLOW-UP  Boyd Andres MD  [address] [facility] [address] [phone]    In 1 week  If Not Better         Medication List      New Prescriptions    meclizine 25 MG tablet  Commonly known as:  ANTIVERT  Take 2 tablets by mouth Every 6 (Six) Hours As Needed for dizziness.              Latest Documented Vital Signs:  As of 4:04 PM  BP- (!) 183/96 HR- 67 Temp- 97.8 °F (36.6 °C) (Oral) O2 sat- 95%       Vadim Houser MD  03/08/19 1602       Vadim Houser MD  03/08/19 1609

## 2019-03-20 ENCOUNTER — HOSPITAL ENCOUNTER (OUTPATIENT)
Facility: HOSPITAL | Age: 74
Setting detail: OBSERVATION
Discharge: HOME | End: 2019-03-21
Attending: EMERGENCY MEDICINE | Admitting: PHYSICIAN ASSISTANT

## 2019-03-20 ENCOUNTER — APPOINTMENT (OUTPATIENT)
Dept: CT IMAGING | Facility: HOSPITAL | Age: 74
End: 2019-03-20

## 2019-03-20 ENCOUNTER — APPOINTMENT (OUTPATIENT)
Dept: GENERAL RADIOLOGY | Facility: HOSPITAL | Age: 74
End: 2019-03-20

## 2019-03-20 DIAGNOSIS — R42 DIZZINESS: ICD-10-CM

## 2019-03-20 DIAGNOSIS — H53.2 DIPLOPIA: Primary | ICD-10-CM

## 2019-03-20 DIAGNOSIS — G52.9 CRANIAL NERVE PALSY: ICD-10-CM

## 2019-03-20 LAB
ALBUMIN SERPL-MCNC: 3.9 G/DL (ref 3.5–5.2)
ALBUMIN/GLOB SERPL: 1.4 G/DL
ALP SERPL-CCNC: 44 U/L (ref 39–117)
ALT SERPL W P-5'-P-CCNC: 31 U/L (ref 1–41)
ANION GAP SERPL CALCULATED.3IONS-SCNC: 11.5 MMOL/L
APTT PPP: 25.6 SECONDS (ref 24.3–38.1)
AST SERPL-CCNC: 25 U/L (ref 1–40)
BASOPHILS # BLD AUTO: 0.09 10*3/MM3 (ref 0–0.2)
BASOPHILS NFR BLD AUTO: 1.2 % (ref 0–1.5)
BILIRUB SERPL-MCNC: 0.4 MG/DL (ref 0.2–1.2)
BUN BLD-MCNC: 14 MG/DL (ref 8–23)
BUN/CREAT SERPL: 15.4 (ref 7–25)
CALCIUM SPEC-SCNC: 8.9 MG/DL (ref 8.6–10.5)
CHLORIDE SERPL-SCNC: 105 MMOL/L (ref 98–107)
CO2 SERPL-SCNC: 26.5 MMOL/L (ref 22–29)
CREAT BLD-MCNC: 0.91 MG/DL (ref 0.76–1.27)
DEPRECATED RDW RBC AUTO: 43 FL (ref 37–54)
EOSINOPHIL # BLD AUTO: 0.36 10*3/MM3 (ref 0–0.4)
EOSINOPHIL NFR BLD AUTO: 4.8 % (ref 0.3–6.2)
ERYTHROCYTE [DISTWIDTH] IN BLOOD BY AUTOMATED COUNT: 12.5 % (ref 12.3–15.4)
GFR SERPL CREATININE-BSD FRML MDRD: 82 ML/MIN/1.73
GLOBULIN UR ELPH-MCNC: 2.8 GM/DL
GLUCOSE BLD-MCNC: 92 MG/DL (ref 65–99)
HCT VFR BLD AUTO: 46.9 % (ref 37.5–51)
HGB BLD-MCNC: 16 G/DL (ref 13–17.7)
IMM GRANULOCYTES # BLD AUTO: 0.03 10*3/MM3 (ref 0–0.05)
IMM GRANULOCYTES NFR BLD AUTO: 0.4 % (ref 0–0.5)
INR PPP: 0.99 (ref 0.9–1.1)
LYMPHOCYTES # BLD AUTO: 1.65 10*3/MM3 (ref 0.7–3.1)
LYMPHOCYTES NFR BLD AUTO: 22.1 % (ref 19.6–45.3)
MCH RBC QN AUTO: 31.6 PG (ref 26.6–33)
MCHC RBC AUTO-ENTMCNC: 34.1 G/DL (ref 31.5–35.7)
MCV RBC AUTO: 92.5 FL (ref 79–97)
MONOCYTES # BLD AUTO: 0.59 10*3/MM3 (ref 0.1–0.9)
MONOCYTES NFR BLD AUTO: 7.9 % (ref 5–12)
NEUTROPHILS # BLD AUTO: 4.76 10*3/MM3 (ref 1.4–7)
NEUTROPHILS NFR BLD AUTO: 63.6 % (ref 42.7–76)
NRBC BLD AUTO-RTO: 0 /100 WBC (ref 0–0)
PLATELET # BLD AUTO: 186 10*3/MM3 (ref 140–450)
PMV BLD AUTO: 10 FL (ref 6–12)
POTASSIUM BLD-SCNC: 4.1 MMOL/L (ref 3.5–5.2)
PROT SERPL-MCNC: 6.7 G/DL (ref 6–8.5)
PROTHROMBIN TIME: 12.8 SECONDS (ref 12.1–15)
RBC # BLD AUTO: 5.07 10*6/MM3 (ref 4.14–5.8)
SODIUM BLD-SCNC: 143 MMOL/L (ref 136–145)
WBC NRBC COR # BLD: 7.48 10*3/MM3 (ref 3.4–10.8)

## 2019-03-20 PROCEDURE — 85025 COMPLETE CBC W/AUTO DIFF WBC: CPT | Performed by: PHYSICIAN ASSISTANT

## 2019-03-20 PROCEDURE — 85730 THROMBOPLASTIN TIME PARTIAL: CPT | Performed by: PHYSICIAN ASSISTANT

## 2019-03-20 PROCEDURE — 70450 CT HEAD/BRAIN W/O DYE: CPT

## 2019-03-20 PROCEDURE — 93005 ELECTROCARDIOGRAM TRACING: CPT | Performed by: PHYSICIAN ASSISTANT

## 2019-03-20 PROCEDURE — G0378 HOSPITAL OBSERVATION PER HR: HCPCS

## 2019-03-20 PROCEDURE — 99284 EMERGENCY DEPT VISIT MOD MDM: CPT

## 2019-03-20 PROCEDURE — 80053 COMPREHEN METABOLIC PANEL: CPT | Performed by: PHYSICIAN ASSISTANT

## 2019-03-20 PROCEDURE — 71045 X-RAY EXAM CHEST 1 VIEW: CPT

## 2019-03-20 PROCEDURE — 93010 ELECTROCARDIOGRAM REPORT: CPT | Performed by: INTERNAL MEDICINE

## 2019-03-20 PROCEDURE — 99284 EMERGENCY DEPT VISIT MOD MDM: CPT | Performed by: EMERGENCY MEDICINE

## 2019-03-20 PROCEDURE — 85610 PROTHROMBIN TIME: CPT | Performed by: PHYSICIAN ASSISTANT

## 2019-03-20 RX ORDER — SODIUM CHLORIDE 0.9 % (FLUSH) 0.9 %
10 SYRINGE (ML) INJECTION AS NEEDED
Status: DISCONTINUED | OUTPATIENT
Start: 2019-03-20 | End: 2019-03-21 | Stop reason: HOSPADM

## 2019-03-21 VITALS
OXYGEN SATURATION: 96 % | WEIGHT: 249.4 LBS | SYSTOLIC BLOOD PRESSURE: 147 MMHG | HEART RATE: 60 BPM | BODY MASS INDEX: 33.78 KG/M2 | HEIGHT: 72 IN | DIASTOLIC BLOOD PRESSURE: 81 MMHG | RESPIRATION RATE: 16 BRPM | TEMPERATURE: 98.1 F

## 2019-03-21 LAB
ANION GAP SERPL CALCULATED.3IONS-SCNC: 8.9 MMOL/L
BASOPHILS # BLD AUTO: 0.08 10*3/MM3 (ref 0–0.2)
BASOPHILS NFR BLD AUTO: 1.2 % (ref 0–1.5)
BUN BLD-MCNC: 16 MG/DL (ref 8–23)
BUN/CREAT SERPL: 17.8 (ref 7–25)
CALCIUM SPEC-SCNC: 8.5 MG/DL (ref 8.6–10.5)
CHLORIDE SERPL-SCNC: 103 MMOL/L (ref 98–107)
CO2 SERPL-SCNC: 28.1 MMOL/L (ref 22–29)
CREAT BLD-MCNC: 0.9 MG/DL (ref 0.76–1.27)
DEPRECATED RDW RBC AUTO: 43.6 FL (ref 37–54)
EOSINOPHIL # BLD AUTO: 0.31 10*3/MM3 (ref 0–0.4)
EOSINOPHIL NFR BLD AUTO: 4.7 % (ref 0.3–6.2)
ERYTHROCYTE [DISTWIDTH] IN BLOOD BY AUTOMATED COUNT: 12.6 % (ref 12.3–15.4)
GFR SERPL CREATININE-BSD FRML MDRD: 83 ML/MIN/1.73
GLUCOSE BLD-MCNC: 88 MG/DL (ref 65–99)
HCT VFR BLD AUTO: 43.9 % (ref 37.5–51)
HGB BLD-MCNC: 14.6 G/DL (ref 13–17.7)
IMM GRANULOCYTES # BLD AUTO: 0.03 10*3/MM3 (ref 0–0.05)
IMM GRANULOCYTES NFR BLD AUTO: 0.5 % (ref 0–0.5)
LYMPHOCYTES # BLD AUTO: 1.71 10*3/MM3 (ref 0.7–3.1)
LYMPHOCYTES NFR BLD AUTO: 26.1 % (ref 19.6–45.3)
MCH RBC QN AUTO: 31.5 PG (ref 26.6–33)
MCHC RBC AUTO-ENTMCNC: 33.3 G/DL (ref 31.5–35.7)
MCV RBC AUTO: 94.6 FL (ref 79–97)
MONOCYTES # BLD AUTO: 0.46 10*3/MM3 (ref 0.1–0.9)
MONOCYTES NFR BLD AUTO: 7 % (ref 5–12)
NEUTROPHILS # BLD AUTO: 3.96 10*3/MM3 (ref 1.4–7)
NEUTROPHILS NFR BLD AUTO: 60.5 % (ref 42.7–76)
NRBC BLD AUTO-RTO: 0 /100 WBC (ref 0–0)
PLATELET # BLD AUTO: 154 10*3/MM3 (ref 140–450)
PMV BLD AUTO: 10.4 FL (ref 6–12)
POTASSIUM BLD-SCNC: 4 MMOL/L (ref 3.5–5.2)
RBC # BLD AUTO: 4.64 10*6/MM3 (ref 4.14–5.8)
SODIUM BLD-SCNC: 140 MMOL/L (ref 136–145)
WBC NRBC COR # BLD: 6.55 10*3/MM3 (ref 3.4–10.8)

## 2019-03-21 PROCEDURE — G0378 HOSPITAL OBSERVATION PER HR: HCPCS

## 2019-03-21 PROCEDURE — 85025 COMPLETE CBC W/AUTO DIFF WBC: CPT | Performed by: INTERNAL MEDICINE

## 2019-03-21 PROCEDURE — 80048 BASIC METABOLIC PNL TOTAL CA: CPT | Performed by: INTERNAL MEDICINE

## 2019-03-21 PROCEDURE — 25010000002 METHYLPREDNISOLONE PER 125 MG: Performed by: PSYCHIATRY & NEUROLOGY

## 2019-03-21 PROCEDURE — 96374 THER/PROPH/DIAG INJ IV PUSH: CPT

## 2019-03-21 PROCEDURE — 99235 HOSP IP/OBS SAME DATE MOD 70: CPT | Performed by: HOSPITALIST

## 2019-03-21 PROCEDURE — 99220 PR INITIAL OBSERVATION CARE/DAY 70 MINUTES: CPT | Performed by: INTERNAL MEDICINE

## 2019-03-21 RX ORDER — ONDANSETRON 2 MG/ML
4 INJECTION INTRAMUSCULAR; INTRAVENOUS EVERY 6 HOURS PRN
Status: DISCONTINUED | OUTPATIENT
Start: 2019-03-21 | End: 2019-03-21 | Stop reason: HOSPADM

## 2019-03-21 RX ORDER — CHOLECALCIFEROL (VITAMIN D3) 125 MCG
5 CAPSULE ORAL NIGHTLY PRN
Status: DISCONTINUED | OUTPATIENT
Start: 2019-03-21 | End: 2019-03-21 | Stop reason: HOSPADM

## 2019-03-21 RX ORDER — SODIUM CHLORIDE 0.9 % (FLUSH) 0.9 %
3 SYRINGE (ML) INJECTION EVERY 12 HOURS SCHEDULED
Status: DISCONTINUED | OUTPATIENT
Start: 2019-03-21 | End: 2019-03-21 | Stop reason: HOSPADM

## 2019-03-21 RX ORDER — ASPIRIN 325 MG
325 TABLET ORAL ONCE
Status: COMPLETED | OUTPATIENT
Start: 2019-03-21 | End: 2019-03-21

## 2019-03-21 RX ORDER — HYDROCODONE BITARTRATE AND ACETAMINOPHEN 10; 325 MG/1; MG/1
TABLET ORAL
Status: COMPLETED
Start: 2019-03-21 | End: 2019-03-21

## 2019-03-21 RX ORDER — METHYLPREDNISOLONE SODIUM SUCCINATE 125 MG/2ML
125 INJECTION, POWDER, LYOPHILIZED, FOR SOLUTION INTRAMUSCULAR; INTRAVENOUS DAILY
Status: DISCONTINUED | OUTPATIENT
Start: 2019-03-21 | End: 2019-03-21 | Stop reason: HOSPADM

## 2019-03-21 RX ORDER — ONDANSETRON 4 MG/1
4 TABLET, FILM COATED ORAL EVERY 6 HOURS PRN
Status: DISCONTINUED | OUTPATIENT
Start: 2019-03-21 | End: 2019-03-21 | Stop reason: HOSPADM

## 2019-03-21 RX ORDER — SENNA AND DOCUSATE SODIUM 50; 8.6 MG/1; MG/1
2 TABLET, FILM COATED ORAL NIGHTLY PRN
Status: DISCONTINUED | OUTPATIENT
Start: 2019-03-21 | End: 2019-03-21 | Stop reason: HOSPADM

## 2019-03-21 RX ORDER — ATENOLOL 25 MG/1
25 TABLET ORAL
Status: DISCONTINUED | OUTPATIENT
Start: 2019-03-21 | End: 2019-03-21 | Stop reason: HOSPADM

## 2019-03-21 RX ORDER — CLONAZEPAM 0.5 MG/1
0.5 TABLET ORAL 2 TIMES DAILY
Status: DISCONTINUED | OUTPATIENT
Start: 2019-03-21 | End: 2019-03-21 | Stop reason: HOSPADM

## 2019-03-21 RX ORDER — ASPIRIN 81 MG/1
81 TABLET, CHEWABLE ORAL DAILY
Status: DISCONTINUED | OUTPATIENT
Start: 2019-03-21 | End: 2019-03-21 | Stop reason: HOSPADM

## 2019-03-21 RX ORDER — PANTOPRAZOLE SODIUM 40 MG/1
40 TABLET, DELAYED RELEASE ORAL EVERY MORNING
Status: DISCONTINUED | OUTPATIENT
Start: 2019-03-21 | End: 2019-03-21 | Stop reason: HOSPADM

## 2019-03-21 RX ORDER — ONDANSETRON 4 MG/1
4 TABLET, ORALLY DISINTEGRATING ORAL EVERY 6 HOURS PRN
Status: DISCONTINUED | OUTPATIENT
Start: 2019-03-21 | End: 2019-03-21 | Stop reason: HOSPADM

## 2019-03-21 RX ORDER — SODIUM CHLORIDE 9 MG/ML
75 INJECTION, SOLUTION INTRAVENOUS CONTINUOUS
Status: DISCONTINUED | OUTPATIENT
Start: 2019-03-21 | End: 2019-03-21 | Stop reason: HOSPADM

## 2019-03-21 RX ORDER — ACETAMINOPHEN 325 MG/1
650 TABLET ORAL EVERY 4 HOURS PRN
Status: DISCONTINUED | OUTPATIENT
Start: 2019-03-21 | End: 2019-03-21 | Stop reason: HOSPADM

## 2019-03-21 RX ORDER — HYDROCODONE BITARTRATE AND ACETAMINOPHEN 10; 325 MG/1; MG/1
1 TABLET ORAL EVERY 6 HOURS PRN
Status: DISCONTINUED | OUTPATIENT
Start: 2019-03-21 | End: 2019-03-21 | Stop reason: HOSPADM

## 2019-03-21 RX ORDER — SODIUM CHLORIDE 0.9 % (FLUSH) 0.9 %
1-10 SYRINGE (ML) INJECTION AS NEEDED
Status: DISCONTINUED | OUTPATIENT
Start: 2019-03-21 | End: 2019-03-21 | Stop reason: HOSPADM

## 2019-03-21 RX ORDER — SODIUM CHLORIDE 9 MG/ML
40 INJECTION, SOLUTION INTRAVENOUS AS NEEDED
Status: DISCONTINUED | OUTPATIENT
Start: 2019-03-21 | End: 2019-03-21 | Stop reason: HOSPADM

## 2019-03-21 RX ORDER — CLONAZEPAM 0.5 MG/1
0.5 TABLET ORAL 2 TIMES DAILY
Qty: 20 TABLET | Refills: 0 | Status: SHIPPED | OUTPATIENT
Start: 2019-03-21 | End: 2019-03-31

## 2019-03-21 RX ADMIN — HYDROCODONE BITARTRATE AND ACETAMINOPHEN 1 TABLET: 10; 325 TABLET ORAL at 08:57

## 2019-03-21 RX ADMIN — PANTOPRAZOLE SODIUM 40 MG: 40 TABLET, DELAYED RELEASE ORAL at 06:32

## 2019-03-21 RX ADMIN — ASPIRIN 81 MG 81 MG: 81 TABLET ORAL at 08:57

## 2019-03-21 RX ADMIN — ASPIRIN 325 MG: 325 TABLET, COATED ORAL at 00:54

## 2019-03-21 RX ADMIN — HYDROCODONE BITARTRATE AND ACETAMINOPHEN 1 TABLET: 10; 325 TABLET ORAL at 01:14

## 2019-03-21 RX ADMIN — METHYLPREDNISOLONE SODIUM SUCCINATE 125 MG: 125 INJECTION, POWDER, FOR SOLUTION INTRAMUSCULAR; INTRAVENOUS at 11:20

## 2019-03-21 RX ADMIN — CLONAZEPAM 0.5 MG: 0.5 TABLET ORAL at 11:20

## 2019-03-21 RX ADMIN — Medication 3 ML: at 01:18

## 2019-03-21 RX ADMIN — SODIUM CHLORIDE 75 ML/HR: 9 INJECTION, SOLUTION INTRAVENOUS at 00:54

## 2019-03-21 RX ADMIN — ATENOLOL 25 MG: 25 TABLET ORAL at 08:57

## 2019-03-21 NOTE — H&P
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 Pt a mild viral illness lasting a couple days about 2 weeks ago associated with a brief period of vomiting at the onset of the viral illness.  The vertigo has been improving over that time until the day prior to admission.  The vertigo lasted seconds and was worsened by looking to the left.  Also associated with blurry vision that was worse when looking to the left, some mild photophobia in the left eye.  Denies seeing 2 images.    Has dental caries and infected teeth that are due to be removed in the next couple of days.  Denies fever, chills, sinus pressure, headache, or ear fullness.    Has chronically elevated blood pressure despite taking 1 antihypertensive.    Has a dual-chamber pacemaker for AV berhane block, so is unable to get an MRI.    Review of systems is negative for numbness and tingling, weakness, slurred speech, difficulty with word finding, memory issues, diarrhea, or abdominal pain.      Past Medical History:   Diagnosis Date   • Chronic anxiety    • Chronic lower back pain    • Complete atrioventricular block (CMS/HCC)     s/p dual chamber pacemaker   • Gout    • Hypertension      Past Surgical History:   Procedure Laterality Date   • INSERT / REPLACE / REMOVE PACEMAKER     • MULTIPLE TOOTH EXTRACTIONS     • PACEMAKER IMPLANTATION      BoS dual chamber, 11/2011     History reviewed. No pertinent family history.  Social History     Tobacco Use   • Smoking status: Former Smoker     Start date: 5/30/1997   • Smokeless tobacco: Never Used   • Tobacco comment: CAFFEINE USE/ ONE CUP IN THE MORNING.    Substance Use Topics   •  "Alcohol use: Yes     Comment: SOCIAL USE    • Drug use: No     Medications Prior to Admission   Medication Sig Dispense Refill Last Dose   • aspirin 81 MG chewable tablet Chew 81 mg Daily.   3/20/2019 at 1000   • atenolol (TENORMIN) 25 MG tablet Take one tablet daily. 90 tablet 3 3/20/2019 at 1000   • Hydrocodone-Acetaminophen (LORTAB )  MG per tablet Take  by mouth 2 (two) times a day.   3/20/2019 at 1000   • meclizine (ANTIVERT) 25 MG tablet Take 2 tablets by mouth Every 6 (Six) Hours As Needed for dizziness. 30 tablet 0 3/20/2019 at 1700   • omeprazole (priLOSEC) 20 MG capsule Take 20 mg by mouth Daily.   Unknown at Unknown time     Allergies:  Sertraline  Debilities: As per HPI and Physical Exam.     REVIEW OF SYSTEMS:  Please see the above history of present illness for pertinent positives and negatives.  The remainder of the patient's systems have been reviewed and are negative.     Vital Signs  Temp:  [98.3 °F (36.8 °C)-98.6 °F (37 °C)] 98.6 °F (37 °C)  Heart Rate:  [60] 60  Resp:  [14-16] 16  BP: (172-173)/(86-97) 173/86    Flowsheet Rows      First Filed Value   Admission Height  182.8 cm (71.97\") Documented at 03/20/2019 2056   Admission Weight  113 kg (250 lb) Documented at 03/20/2019 2056           Physical Exam:  Physical Exam   Constitutional: He is oriented to person, place, and time. He appears well-developed and well-nourished. No distress.   HENT:   Head: Normocephalic and atraumatic.   Right Ear: Hearing, tympanic membrane, external ear and ear canal normal. No swelling or tenderness. No mastoid tenderness. Tympanic membrane is not injected, not scarred, not perforated, not erythematous and not bulging. No middle ear effusion.   Left Ear: Hearing, tympanic membrane, external ear and ear canal normal. No swelling or tenderness. No mastoid tenderness. Tympanic membrane is not injected, not scarred, not perforated, not erythematous and not bulging.  No middle ear effusion.   Nose: Nose " normal.   Mouth/Throat: Uvula is midline, oropharynx is clear and moist and mucous membranes are normal. Dental caries present. No dental abscesses. No oropharyngeal exudate. Tonsils are 0 on the right. Tonsils are 0 on the left.   Halitosis noted but no floresita dental abscess   Eyes: Conjunctivae are normal. Pupils are equal, round, and reactive to light. No scleral icterus. Right eye exhibits normal extraocular motion and no nystagmus. Left eye exhibits abnormal extraocular motion and nystagmus. Pupils are equal.   Left eye has intermittent issues with weakness in the medial rectus most noted on left lateral gaze and attempts at convergence. Left Medial rectus appeared to function on right lateral gaze.  No overt weakness in the inferior/superior rectus or inferior oblique noted.   Nystagmus is only noted in the left eye, it is 6-9 beat, horizontal, but can not rule out rotational  No gross dysfunction in accomadation   Neck: Normal range of motion. Neck supple. No JVD present. No tracheal deviation present.   Left sided submandiblar lymph node   Cardiovascular: Normal rate, regular rhythm and normal heart sounds. Exam reveals no friction rub.   No murmur heard.  Pulmonary/Chest: Effort normal and breath sounds normal. No stridor. No respiratory distress. He has no wheezes. He has no rales.   Abdominal: Soft. Bowel sounds are normal. He exhibits no distension. There is no tenderness. There is no guarding.   Musculoskeletal: Normal range of motion. He exhibits no edema, tenderness or deformity.   Lymphadenopathy:     He has cervical adenopathy.   Neurological: He is alert and oriented to person, place, and time. He has normal strength. He displays no atrophy, no tremor and normal reflexes. A cranial nerve deficit is present. No sensory deficit. He exhibits normal muscle tone. Coordination normal. GCS eye subscore is 4. GCS verbal subscore is 5. GCS motor subscore is 6.   Isolated with weakness in the left medial  rectus   Skin: Skin is warm and dry. No rash noted. He is not diaphoretic. No erythema.   Psychiatric: He has a normal mood and affect. His behavior is normal.   Nursing note and vitals reviewed.       Results Review:    I reviewed the patient's new clinical results.  Lab Results (most recent)     Procedure Component Value Units Date/Time    Comprehensive Metabolic Panel [[id number]] Collected:  03/20/19 2204    Specimen:  Blood Updated:  03/20/19 2226     Glucose 92 mg/dL      BUN 14 mg/dL      Creatinine 0.91 mg/dL      Sodium 143 mmol/L      Potassium 4.1 mmol/L      Chloride 105 mmol/L      CO2 26.5 mmol/L      Calcium 8.9 mg/dL      Total Protein 6.7 g/dL      Albumin 3.90 g/dL      ALT (SGPT) 31 U/L      AST (SGOT) 25 U/L      Alkaline Phosphatase 44 U/L      Total Bilirubin 0.4 mg/dL      eGFR Non African Amer 82 mL/min/1.73      Globulin 2.8 gm/dL      A/G Ratio 1.4 g/dL      BUN/Creatinine Ratio 15.4     Anion Gap 11.5 mmol/L     Narrative:       GFR Normal >60  Chronic Kidney Disease <60  Kidney Failure <15    Protime-INR [[id number]]  (Normal) Collected:  03/20/19 2204    Specimen:  Blood Updated:  03/20/19 2217     Protime 12.8 Seconds      INR 0.99    Narrative:       Therapeutic Ranges for INR: 2.0-3.0 (PT 20-30)                              2.5-3.5 (PT 25-34)    aPTT [[id number]]  (Normal) Collected:  03/20/19 2204    Specimen:  Blood Updated:  03/20/19 2217     PTT 25.6 seconds     Narrative:       PTT = The equivalent PTT values for the therapeutic range of heparin levels at 0.1 to 0.7 U/ml are 53 to 110 seconds.    CBC & Differential [[id number]] Collected:  03/20/19 2204    Specimen:  Blood Updated:  03/20/19 2210    Narrative:       The following orders were created for panel order CBC & Differential.  Procedure                               Abnormality         Status                     ---------                               -----------         ------                     CBC Auto  Differential[[id number]]        Normal              Final result                 Please view results for these tests on the individual orders.    CBC Auto Differential [[id number]]  (Normal) Collected:  03/20/19 2204    Specimen:  Blood Updated:  03/20/19 2210     WBC 7.48 10*3/mm3      RBC 5.07 10*6/mm3      Hemoglobin 16.0 g/dL      Hematocrit 46.9 %      MCV 92.5 fL      MCH 31.6 pg      MCHC 34.1 g/dL      RDW 12.5 %      RDW-SD 43.0 fl      MPV 10.0 fL      Platelets 186 10*3/mm3      Neutrophil % 63.6 %      Lymphocyte % 22.1 %      Monocyte % 7.9 %      Eosinophil % 4.8 %      Basophil % 1.2 %      Immature Grans % 0.4 %      Neutrophils, Absolute 4.76 10*3/mm3      Lymphocytes, Absolute 1.65 10*3/mm3      Monocytes, Absolute 0.59 10*3/mm3      Eosinophils, Absolute 0.36 10*3/mm3      Basophils, Absolute 0.09 10*3/mm3      Immature Grans, Absolute 0.03 10*3/mm3      nRBC 0.0 /100 WBC           Imaging Results (most recent)     Procedure Component Value Units Date/Time    XR Chest 1 View [[id number]] Updated:  03/20/19 2223    CT Head Without Contrast [[id number]] Updated:  03/20/19 2151        Reviewed  Independent read of CT head image: No opacities seen in the maxillary, ethmoid or mastoid sinuses, no obvious intracranial mass or midline shift    ECG/EMG Results (most recent)     Procedure Component Value Units Date/Time    ECG 12 Lead [[id number]] Collected:  03/20/19 2151     Updated:  03/20/19 2153    Narrative:       HEART RATE= 60  bpm  RR Interval= 1001  ms  CA Interval= 168  ms  P Horizontal Axis= -55  deg  P Front Axis= -38  deg  QRSD Interval= 173  ms  QT Interval= 476  ms  QRS Axis= -70  deg  T Wave Axis= 89  deg  - ABNORMAL ECG -  Atrial-ventricular dual-paced rhythm  Electronically Signed By:   Date and Time of Study: 2019-03-20 21:51:34        reviewed    Assessment/Plan     Vertigo with nystagmus and intermittent weakness and 1 of the innervations of CN III   -DDX includes peripheral neuritis post  viral illness, irritation from dental abscess, microvascular vessel disease from uncontrolled hypertension  -Neuro consulted  -We will hold off on steroids and benzos for now until neuro evaluates  -White count is not elevated, no obvious sign of infection on noncontrast CT but will await official read in morning  -Unable to get MRI due to pacemaker  -Uncertain if contrast CT of the head and neck would help  -Appreciate neurology's assistance    Left-sided dental caries  -White count normal, but does have lymphadenopathy in the cervical chains on that side  -Scheduled this week to have teeth removed    Uncontrolled hypertension  -Systolic blood pressures have been in the 170s here, on his home beta-blocker    I discussed the patients findings and my recommendations with patient and wife.     Salima Nava MD  03/21/19  3:06 AM

## 2019-03-21 NOTE — DISCHARGE SUMMARY
Felipe Lucas  1945  [id number]    Hospitalists Discharge Summary    Date of Admission: 3/20/2019  Date of Discharge:  3/21/2019    Primary Discharge Diagnoses:    1.  Vertigo  2.  Left-sided Dental Caries    Secondary Discharge Diagnoses:    1.  HTN      History of Present Illness (taken from H&P):    74 yo WM w/ PMH of 2 infected teeth on the left scheduled for removal in a few daysl, 3rd Degree AV block s/p dual chamber pacer, HTN, and LBP on chronic opioids, who p/w 10 day to 2 week history of vertigo, and a 1 day history vision changes. Pt a mild viral illness lasting a couple days about 2 weeks ago associated with a brief period of vomiting at the onset of the viral illness.  The vertigo has been improving over that time until the day prior to admission.  The vertigo lasted seconds and was worsened by looking to the left.  Also associated with blurry vision that was worse when looking to the left, some mild photophobia in the left eye.  Denies seeing 2 images.     Has dental caries and infected teeth that are due to be removed in the next couple of days.  Denies fever, chills, sinus pressure, headache, or ear fullness.     Has chronically elevated blood pressure despite taking 1 antihypertensive.     Has a dual-chamber pacemaker for AV berhane block, so is unable to get an MRI.     Review of systems is negative for numbness and tingling, weakness, slurred speech, difficulty with word finding, memory issues, diarrhea, or abdominal pain.      Hospital Course:    Mr. Lucas was admitted to the Med/Surg unit.  He was seen in consultation by Dr. Lim and trwated w/ a dose of solumedrol and Klonopin.  Recommended Mr. Lucas not continue to use Norco while on the course of Klonopin.  Blood pressure much improved at discharge.    PCP  Patient Care Team:  Boyd Andres MD as PCP - General (Family Medicine)  Jamarcus Boateng MD as PCP - Claims Attributed    Consults:   Consults      Date and Time Order Name Status Description    3/21/2019 0017 Inpatient Neurology Consult General Completed           Operations and Procedures Performed:       Ct Head Without Contrast    Result Date: 3/21/2019  Narrative: HEAD CT WITHOUT CONTRAST 03/20/2019  HISTORY: Acute onset of dizziness today. Acute onset of left C6 cranial nerve palsy. No known trauma.  TECHNIQUE: Multiple axial images were obtained from the skull base to vertex without intravenous contrast administration. Radiation dose reduction techniques included automated exposure control or exposure modulation based on body size. Radiation audit for CT and nuclear cardiology exams in the last 12 months: 1.  FINDINGS: There is mild generalized atrophy. There is no midline shift. There is no mass or mass effect, hemorrhage or acute infarct. The visualized paranasal sinuses are clear.      Impression: Atrophy. No acute intracranial abnormality.  Initial stat report to the ED by Dr. Harris at 2150 on 03/20/2019.  This report was finalized on 3/21/2019 7:34 AM by Dr. Julius Liao MD.      Xr Chest 1 View    Result Date: 3/21/2019  Narrative: CHEST ONE VIEW 03/20/2019  HISTORY: Acute onset of dizziness today with no known trauma. Benign essential hypertension. Complete atrioventricular block status post pacemaker placement.  FINDINGS: The cardiac and mediastinal structures are stable compared with 09/10/2017. Cardiac pacemaker is unchanged. The lungs are clear. There are no pleural effusions.      Impression: No active pulmonary disease.  This report was finalized on 3/21/2019 7:35 AM by Dr. Julius Liao MD.        Allergies:  is allergic to sertraline.      Discharge Medications:     Discharge Medications      New Medications      Instructions Start Date   clonazePAM 0.5 MG tablet  Commonly known as:  KlonoPIN   0.5 mg, Oral, 2 Times Daily         Continue These Medications      Instructions Start Date   aspirin 81 MG chewable tablet   81 mg, Oral, Daily       atenolol 25 MG tablet  Commonly known as:  TENORMIN   Take one tablet daily.      omeprazole 20 MG capsule  Commonly known as:  priLOSEC   20 mg, Oral, Daily         Stop These Medications    Hydrocodone-Acetaminophen  MG per tablet  Commonly known as:  LORTAB      meclizine 25 MG tablet  Commonly known as:  ANTIVERT            Last Lab Results:   Lab Results (most recent)     Procedure Component Value Units Date/Time    Basic Metabolic Panel [[id number]]  (Abnormal) Collected:  03/21/19 0415    Specimen:  Blood Updated:  03/21/19 0527     Glucose 88 mg/dL      BUN 16 mg/dL      Creatinine 0.90 mg/dL      Sodium 140 mmol/L      Potassium 4.0 mmol/L      Chloride 103 mmol/L      CO2 28.1 mmol/L      Calcium 8.5 mg/dL      eGFR Non African Amer 83 mL/min/1.73      BUN/Creatinine Ratio 17.8     Anion Gap 8.9 mmol/L     Narrative:       GFR Normal >60  Chronic Kidney Disease <60  Kidney Failure <15    CBC Auto Differential [[id number]]  (Normal) Collected:  03/21/19 0415    Specimen:  Blood Updated:  03/21/19 0510     WBC 6.55 10*3/mm3      RBC 4.64 10*6/mm3      Hemoglobin 14.6 g/dL      Hematocrit 43.9 %      MCV 94.6 fL      MCH 31.5 pg      MCHC 33.3 g/dL      RDW 12.6 %      RDW-SD 43.6 fl      MPV 10.4 fL      Platelets 154 10*3/mm3      Neutrophil % 60.5 %      Lymphocyte % 26.1 %      Monocyte % 7.0 %      Eosinophil % 4.7 %      Basophil % 1.2 %      Immature Grans % 0.5 %      Neutrophils, Absolute 3.96 10*3/mm3      Lymphocytes, Absolute 1.71 10*3/mm3      Monocytes, Absolute 0.46 10*3/mm3      Eosinophils, Absolute 0.31 10*3/mm3      Basophils, Absolute 0.08 10*3/mm3      Immature Grans, Absolute 0.03 10*3/mm3      nRBC 0.0 /100 WBC     Comprehensive Metabolic Panel [[id number]] Collected:  03/20/19 2204    Specimen:  Blood Updated:  03/20/19 2226     Glucose 92 mg/dL      BUN 14 mg/dL      Creatinine 0.91 mg/dL      Sodium 143 mmol/L      Potassium 4.1 mmol/L      Chloride 105 mmol/L      CO2  26.5 mmol/L      Calcium 8.9 mg/dL      Total Protein 6.7 g/dL      Albumin 3.90 g/dL      ALT (SGPT) 31 U/L      AST (SGOT) 25 U/L      Alkaline Phosphatase 44 U/L      Total Bilirubin 0.4 mg/dL      eGFR Non African Amer 82 mL/min/1.73      Globulin 2.8 gm/dL      A/G Ratio 1.4 g/dL      BUN/Creatinine Ratio 15.4     Anion Gap 11.5 mmol/L     Narrative:       GFR Normal >60  Chronic Kidney Disease <60  Kidney Failure <15    Protime-INR [[id number]]  (Normal) Collected:  03/20/19 2204    Specimen:  Blood Updated:  03/20/19 2217     Protime 12.8 Seconds      INR 0.99    Narrative:       Therapeutic Ranges for INR: 2.0-3.0 (PT 20-30)                              2.5-3.5 (PT 25-34)    aPTT [[id number]]  (Normal) Collected:  03/20/19 2204    Specimen:  Blood Updated:  03/20/19 2217     PTT 25.6 seconds     Narrative:       PTT = The equivalent PTT values for the therapeutic range of heparin levels at 0.1 to 0.7 U/ml are 53 to 110 seconds.    CBC & Differential [[id number]] Collected:  03/20/19 2204    Specimen:  Blood Updated:  03/20/19 2210    Narrative:       The following orders were created for panel order CBC & Differential.  Procedure                               Abnormality         Status                     ---------                               -----------         ------                     CBC Auto Differential[[id number]]        Normal              Final result                 Please view results for these tests on the individual orders.    CBC Auto Differential [[id number]]  (Normal) Collected:  03/20/19 2204    Specimen:  Blood Updated:  03/20/19 2210     WBC 7.48 10*3/mm3      RBC 5.07 10*6/mm3      Hemoglobin 16.0 g/dL      Hematocrit 46.9 %      MCV 92.5 fL      MCH 31.6 pg      MCHC 34.1 g/dL      RDW 12.5 %      RDW-SD 43.0 fl      MPV 10.0 fL      Platelets 186 10*3/mm3      Neutrophil % 63.6 %      Lymphocyte % 22.1 %      Monocyte % 7.9 %      Eosinophil % 4.8 %      Basophil % 1.2 %      Immature  Grans % 0.4 %      Neutrophils, Absolute 4.76 10*3/mm3      Lymphocytes, Absolute 1.65 10*3/mm3      Monocytes, Absolute 0.59 10*3/mm3      Eosinophils, Absolute 0.36 10*3/mm3      Basophils, Absolute 0.09 10*3/mm3      Immature Grans, Absolute 0.03 10*3/mm3      nRBC 0.0 /100 WBC         Imaging Results (most recent)     Procedure Component Value Units Date/Time    XR Chest 1 View [[id number]] Collected:  03/21/19 0734     Updated:  03/21/19 0737    Narrative:       CHEST ONE VIEW 03/20/2019     HISTORY:   Acute onset of dizziness today with no known trauma. Benign essential  hypertension. Complete atrioventricular block status post pacemaker  placement.     FINDINGS:  The cardiac and mediastinal structures are stable compared with  09/10/2017. Cardiac pacemaker is unchanged. The lungs are clear. There  are no pleural effusions.       Impression:       No active pulmonary disease.     This report was finalized on 3/21/2019 7:35 AM by Dr. Julius Liao MD.       CT Head Without Contrast [[id number]] Collected:  03/21/19 0733     Updated:  03/21/19 0736    Narrative:       HEAD CT WITHOUT CONTRAST 03/20/2019     HISTORY:  Acute onset of dizziness today. Acute onset of left C6 cranial nerve  palsy. No known trauma.     TECHNIQUE:  Multiple axial images were obtained from the skull base to vertex  without intravenous contrast administration. Radiation dose reduction  techniques included automated exposure control or exposure modulation  based on body size. Radiation audit for CT and nuclear cardiology exams  in the last 12 months: 1.     FINDINGS:  There is mild generalized atrophy. There is no midline shift. There is  no mass or mass effect, hemorrhage or acute infarct. The visualized  paranasal sinuses are clear.       Impression:       Atrophy. No acute intracranial abnormality.     Initial stat report to the ED by Dr. Harris at 2150 on 03/20/2019.     This report was finalized on 3/21/2019 7:34 AM by Dr. Julius Liao  MD.             PROCEDURES      Condition on Discharge: Stable    Physical Exam at Discharge  Vital Signs  Temp:  [97.4 °F (36.3 °C)-98.6 °F (37 °C)] 97.4 °F (36.3 °C)  Heart Rate:  [60-61] 60  Resp:  [14-16] 16  BP: (138-173)/(75-97) 138/75    Physical Exam:  Physical Exam   Constitutional: Patient appears well-developed and well-nourished and in no acute distress   Cardiovascular: Regular rate, regular rhythm, S1 normal and S2 normal.  Exam reveals no gallop and no friction rub.  No murmur heard.  Pulmonary/Chest: Lungs are clear to auscultation bilaterally. No respiratory distress. No wheezes. No rhonchi. No rales.   Abdominal: Obese. Soft. Bowel sounds are normal. No distension and no mass. There is no tenderness.   Neurological: Patient is alert and oriented to person, place, and time. Cranial nerves II-XII are grossly intact with no focal deficits.    Discharge Disposition:  Home    Visiting Nurse:    No     Home PT/OT:  No     Home Safety Evaluation:  No     DME  None    Discharge Diet:      Dietary Orders (From admission, onward)    Start     Ordered    03/21/19 0850  Diet Regular; Cardiac  Diet Effective Now     Question Answer Comment   Diet Texture / Consistency Regular    Common Modifiers Cardiac        03/21/19 0850          Activity at Discharge:  As tolerated      Follow-up Appointments  Future Appointments   Date Time Provider Department Center   5/9/2019  1:30 PM [facility] None     Additional Instructions for the Follow-ups that You Need to Schedule     Discharge Follow-up with PCP   As directed       Currently Documented PCP:    Boyd Andres MD    PCP Phone Number:    [phone]     Follow Up Details:  1 week                  Rusty Layne MD  03/21/19  11:19 AM

## 2019-03-21 NOTE — CONSULTS
Patient Identification:  NAME:  Felipe Lucas  Age:  73 y.o.   Sex:  male   :  1945   MRN:  [id number]       Chief complaint: I have vertigo reason for consult vertigo    History of present illness: This patient is 73-year-old right-handed white male with a history of pacemaker gout hypertension anxiety who has had vertigo for the last week or 2 context of patient who is not had any headache paresthesias or paralysis he denies double vision but at one point thought his vision might be blurry when he was having spinning location is worse when looking to the left duration is noted quality is a spinning sensation not distal lightheaded feeling CT of the head by my independent I will review is normal he is never had anything with any of this that suggest a stroke again he denies true double vision paresthesias paralysis and he thinks his head is less dizzy today but still it is made worse by moving his head      Past medical history:  Past Medical History:   Diagnosis Date   • Chronic anxiety    • Chronic lower back pain    • Complete atrioventricular block (CMS/HCC)     s/p dual chamber pacemaker   • Gout    • Hypertension        Past surgical history:  Past Surgical History:   Procedure Laterality Date   • INSERT / REPLACE / REMOVE PACEMAKER     • MULTIPLE TOOTH EXTRACTIONS     • PACEMAKER IMPLANTATION      [employer] dual chamber, 2011       Allergies:  Sertraline    Home medications:  Medications Prior to Admission   Medication Sig Dispense Refill Last Dose   • aspirin 81 MG chewable tablet Chew 81 mg Daily.   3/20/2019 at 1000   • atenolol (TENORMIN) 25 MG tablet Take one tablet daily. 90 tablet 3 3/20/2019 at 1000   • Hydrocodone-Acetaminophen (LORTAB )  MG per tablet Take  by mouth 2 (two) times a day.   3/20/2019 at 1000   • meclizine (ANTIVERT) 25 MG tablet Take 2 tablets by mouth Every 6 (Six) Hours As Needed for dizziness. 30 tablet 0 3/20/2019 at 1700   • omeprazole (priLOSEC) 20  MG capsule Take 20 mg by mouth Daily.   Unknown at Unknown time        Hospital medications:    aspirin 81 mg Oral Daily   atenolol 25 mg Oral Q24H   clonazePAM 0.5 mg Oral BID   methylPREDNISolone sodium succinate 125 mg Intravenous Daily   pantoprazole 40 mg Oral QAM   sodium chloride 3 mL Intravenous Q12H       sodium chloride 75 mL/hr Last Rate: 75 mL/hr (03/21/19 0658)     •  acetaminophen  •  HYDROcodone-acetaminophen  •  melatonin  •  ondansetron **OR** ondansetron ODT **OR** ondansetron  •  sennosides-docusate sodium  •  sodium chloride  •  [COMPLETED] Insert peripheral IV **AND** sodium chloride  •  sodium chloride    Family history:  History reviewed. No pertinent family history.    Social history:  Social History     Tobacco Use   • Smoking status: Former Smoker     Start date: 5/30/1997   • Smokeless tobacco: Never Used   • Tobacco comment: CAFFEINE USE/ ONE CUP IN THE MORNING.    Substance Use Topics   • Alcohol use: Yes     Comment: SOCIAL USE    • Drug use: No       Review of systems:    No double vision he thought maybe his vision was a little blurry but he was having spinning no hearing loss or ringing in either ear no hair eyes ears nose throat skin bone joint  lymphatic hematologic or oncologic complaints no neck pain chest pain abdominal pain bowel bladder incontinence fever chills or rash    Objective:  Vitals Ranges:   Temp:  [97.4 °F (36.3 °C)-98.6 °F (37 °C)] 97.4 °F (36.3 °C)  Heart Rate:  [60-61] 60  Resp:  [14-16] 16  BP: (138-173)/(75-97) 138/75      Physical Exam: Awake alert oriented x3 in no distress fund of knowledge attention span concentration recent remote memory language is normal well-developed well-nourished in no distress pupils one half constricting to 1 normal disc redness visual fields extraocular moods are full with nystagmus upon left gaze nasolabial folds palate tongue symmetrical normal hearing facial sensation head turning shoulder shrug motor 5 out of 5 out of 4  extremities no atrophy fasciculations rigidity or bradykinesia reflexes trace throughout symmetrical toes downgoing bilaterally sensation normal light touch face arms and legs coordination normal in upper and lower extremity station and gait was not checked for fear would let him fall heart regular without murmur neck supple without bruits extremities no clubbing cyanosis edema visual acuity normal at 3 feet    Results review:   I reviewed the patient's new clinical results.    Data review:  Lab Results (last 24 hours)     Procedure Component Value Units Date/Time    Basic Metabolic Panel [[id number]]  (Abnormal) Collected:  03/21/19 0415    Specimen:  Blood Updated:  03/21/19 0527     Glucose 88 mg/dL      BUN 16 mg/dL      Creatinine 0.90 mg/dL      Sodium 140 mmol/L      Potassium 4.0 mmol/L      Chloride 103 mmol/L      CO2 28.1 mmol/L      Calcium 8.5 mg/dL      eGFR Non African Amer 83 mL/min/1.73      BUN/Creatinine Ratio 17.8     Anion Gap 8.9 mmol/L     Narrative:       GFR Normal >60  Chronic Kidney Disease <60  Kidney Failure <15    CBC Auto Differential [[id number]]  (Normal) Collected:  03/21/19 0415    Specimen:  Blood Updated:  03/21/19 0510     WBC 6.55 10*3/mm3      RBC 4.64 10*6/mm3      Hemoglobin 14.6 g/dL      Hematocrit 43.9 %      MCV 94.6 fL      MCH 31.5 pg      MCHC 33.3 g/dL      RDW 12.6 %      RDW-SD 43.6 fl      MPV 10.4 fL      Platelets 154 10*3/mm3      Neutrophil % 60.5 %      Lymphocyte % 26.1 %      Monocyte % 7.0 %      Eosinophil % 4.7 %      Basophil % 1.2 %      Immature Grans % 0.5 %      Neutrophils, Absolute 3.96 10*3/mm3      Lymphocytes, Absolute 1.71 10*3/mm3      Monocytes, Absolute 0.46 10*3/mm3      Eosinophils, Absolute 0.31 10*3/mm3      Basophils, Absolute 0.08 10*3/mm3      Immature Grans, Absolute 0.03 10*3/mm3      nRBC 0.0 /100 WBC     Comprehensive Metabolic Panel [[id number]] Collected:  03/20/19 2204    Specimen:  Blood Updated:  03/20/19 2226     Glucose 92  mg/dL      BUN 14 mg/dL      Creatinine 0.91 mg/dL      Sodium 143 mmol/L      Potassium 4.1 mmol/L      Chloride 105 mmol/L      CO2 26.5 mmol/L      Calcium 8.9 mg/dL      Total Protein 6.7 g/dL      Albumin 3.90 g/dL      ALT (SGPT) 31 U/L      AST (SGOT) 25 U/L      Alkaline Phosphatase 44 U/L      Total Bilirubin 0.4 mg/dL      eGFR Non African Amer 82 mL/min/1.73      Globulin 2.8 gm/dL      A/G Ratio 1.4 g/dL      BUN/Creatinine Ratio 15.4     Anion Gap 11.5 mmol/L     Narrative:       GFR Normal >60  Chronic Kidney Disease <60  Kidney Failure <15    Protime-INR [[id number]]  (Normal) Collected:  03/20/19 2204    Specimen:  Blood Updated:  03/20/19 2217     Protime 12.8 Seconds      INR 0.99    Narrative:       Therapeutic Ranges for INR: 2.0-3.0 (PT 20-30)                              2.5-3.5 (PT 25-34)    aPTT [[id number]]  (Normal) Collected:  03/20/19 2204    Specimen:  Blood Updated:  03/20/19 2217     PTT 25.6 seconds     Narrative:       PTT = The equivalent PTT values for the therapeutic range of heparin levels at 0.1 to 0.7 U/ml are 53 to 110 seconds.    CBC & Differential [[id number]] Collected:  03/20/19 2204    Specimen:  Blood Updated:  03/20/19 2210    Narrative:       The following orders were created for panel order CBC & Differential.  Procedure                               Abnormality         Status                     ---------                               -----------         ------                     CBC Auto Differential[[id number]]        Normal              Final result                 Please view results for these tests on the individual orders.    CBC Auto Differential [[id number]]  (Normal) Collected:  03/20/19 2204    Specimen:  Blood Updated:  03/20/19 2210     WBC 7.48 10*3/mm3      RBC 5.07 10*6/mm3      Hemoglobin 16.0 g/dL      Hematocrit 46.9 %      MCV 92.5 fL      MCH 31.6 pg      MCHC 34.1 g/dL      RDW 12.5 %      RDW-SD 43.0 fl      MPV 10.0 fL      Platelets 186 10*3/mm3       Neutrophil % 63.6 %      Lymphocyte % 22.1 %      Monocyte % 7.9 %      Eosinophil % 4.8 %      Basophil % 1.2 %      Immature Grans % 0.4 %      Neutrophils, Absolute 4.76 10*3/mm3      Lymphocytes, Absolute 1.65 10*3/mm3      Monocytes, Absolute 0.59 10*3/mm3      Eosinophils, Absolute 0.36 10*3/mm3      Basophils, Absolute 0.09 10*3/mm3      Immature Grans, Absolute 0.03 10*3/mm3      nRBC 0.0 /100 WBC            Imaging:  Imaging Results (last 24 hours)     Procedure Component Value Units Date/Time    XR Chest 1 View [[id number]] Collected:  03/21/19 0734     Updated:  03/21/19 0737    Narrative:       CHEST ONE VIEW 03/20/2019     HISTORY:   Acute onset of dizziness today with no known trauma. Benign essential  hypertension. Complete atrioventricular block status post pacemaker  placement.     FINDINGS:  The cardiac and mediastinal structures are stable compared with  09/10/2017. Cardiac pacemaker is unchanged. The lungs are clear. There  are no pleural effusions.       Impression:       No active pulmonary disease.     This report was finalized on 3/21/2019 7:35 AM by Dr. Julius Liao MD.       CT Head Without Contrast [[id number]] Collected:  03/21/19 0733     Updated:  03/21/19 0736    Narrative:       HEAD CT WITHOUT CONTRAST 03/20/2019     HISTORY:  Acute onset of dizziness today. Acute onset of left C6 cranial nerve  palsy. No known trauma.     TECHNIQUE:  Multiple axial images were obtained from the skull base to vertex  without intravenous contrast administration. Radiation dose reduction  techniques included automated exposure control or exposure modulation  based on body size. Radiation audit for CT and nuclear cardiology exams  in the last 12 months: 1.     FINDINGS:  There is mild generalized atrophy. There is no midline shift. There is  no mass or mass effect, hemorrhage or acute infarct. The visualized  paranasal sinuses are clear.       Impression:       Atrophy. No acute intracranial  abnormality.     Initial stat report to the ED by Dr. Harris at 2150 on 03/20/2019.     This report was finalized on 3/21/2019 7:34 AM by Dr. Julius Liao MD.                Assessment and Plan:     Right peripheral vestibulopathy this is not a stroke TIA or seizure syndrome and he does not have double vision.  He is getting Solu-Medrol 125 mg IV now and can go home with a prescription for Klonopin 0.5 mg p.o. twice daily to take on an as-needed basis for the next week.  I would not recommend further workup thanks      Paul Lim MD  03/21/19  10:24 AM

## 2019-03-21 NOTE — PLAN OF CARE
Problem: Patient Care Overview  Goal: Plan of Care Review  Outcome: Ongoing (interventions implemented as appropriate)   03/21/19 0418   Coping/Psychosocial   Plan of Care Reviewed With patient   Plan of Care Review   Progress no change   OTHER   Outcome Summary Pt VSS. IVF initiated. NPO c ice chips and sips c meds. Will continue to monitor.      Goal: Individualization and Mutuality  Outcome: Ongoing (interventions implemented as appropriate)    Goal: Discharge Needs Assessment  Outcome: Ongoing (interventions implemented as appropriate)    Goal: Interprofessional Rounds/Family Conf  Outcome: Ongoing (interventions implemented as appropriate)      Problem: Pain, Acute (Adult)  Goal: Identify Related Risk Factors and Signs and Symptoms  Outcome: Ongoing (interventions implemented as appropriate)    Goal: Acceptable Pain Control/Comfort Level  Outcome: Ongoing (interventions implemented as appropriate)

## 2019-03-21 NOTE — ED PROVIDER NOTES
" EMERGENCY DEPARTMENT ENCOUNTER      Room Number: D/D    History is provided by the patient, no translation services needed    HPI:    Chief complaint: Dizziness and blurred vision    Location: Denies pain    Quality/Severity:  -    Timing/Duration: 12 hours prior to arrival    Modifying Factors: Changing positions increases his dizziness, eye movements cause him to have blurred vision.    Associated Symptoms: Dizziness, lightheadedness.  Also positive for recent history of vertigo, treated with meclizine and improved.  Denies headaches, fever, weakness in extremities, abnormal gait, slurred speech, altered mental status, chest pain, shortness of air, abdominal pain    Narrative: Pt is a 73 y.o. male who presents complaining of dizziness, lightheadedness, and blurred vision starting 12 hours prior to arrival.  Patient reports he was seen here on 3/8/2019 and treated for vertigo with meclizine.  Patient reports he has not had meclizine in 3 days, and began having dizziness 12 hours ago.  States he is also having problems with his left eye stating it is \"not catching up with the right eye,\" and it is causing him to have blurred vision if he is moving his eyes.  Once he looks at something he states he is able to focus on it and no longer has blurred vision.  Patient's wife states she noticed his left eyelid is drooping more than than the right and he has never had a \"lazy eye\" in the past but she states he looks like he has one tonight.  Patient reports his dizziness increases with changing positions.  States he feels like the room is spinning around him and he also feels lightheaded as if he may pass out. He denies any syncope or diaphoresis.    PMD: Boyd Andres MD    REVIEW OF SYSTEMS  Review of Systems   Constitutional: Negative for chills, fatigue and fever.   HENT: Negative for congestion, hearing loss, rhinorrhea, sinus pain and sore throat.    Eyes: Positive for visual disturbance (States his left eye " "isn't \"catching up\" ). Negative for photophobia and pain.   Respiratory: Negative for cough and shortness of breath.    Cardiovascular: Negative for chest pain and palpitations.   Gastrointestinal: Negative for abdominal distention, abdominal pain, diarrhea, nausea and vomiting.   Genitourinary: Negative for decreased urine volume, difficulty urinating, dysuria and urgency.   Musculoskeletal: Negative for myalgias, neck pain and neck stiffness.   Skin: Negative for color change, pallor, rash and wound.   Neurological: Positive for dizziness, facial asymmetry (Left eyelid drooping) and light-headedness. Negative for seizures, syncope, speech difficulty, weakness, numbness and headaches.   Psychiatric/Behavioral: Negative for confusion and hallucinations.       PAST MEDICAL HISTORY  Active Ambulatory Problems     Diagnosis Date Noted   • Hypertension    • Complete atrioventricular block (CMS/HCC)    • History of pacemaker 05/22/2018     Resolved Ambulatory Problems     Diagnosis Date Noted   • No Resolved Ambulatory Problems     Past Medical History:   Diagnosis Date   • Chronic anxiety    • Chronic lower back pain    • Complete atrioventricular block (CMS/HCC)    • Gout    • Hypertension        PAST SURGICAL HISTORY  Past Surgical History:   Procedure Laterality Date   • INSERT / REPLACE / REMOVE PACEMAKER     • MULTIPLE TOOTH EXTRACTIONS     • PACEMAKER IMPLANTATION      [employer] dual chamber, 11/2011       FAMILY HISTORY  History reviewed. No pertinent family history.    SOCIAL HISTORY  Social History     Socioeconomic History   • Marital status:      Spouse name: Not on file   • Number of children: Not on file   • Years of education: Not on file   • Highest education level: Not on file   Tobacco Use   • Smoking status: Former Smoker     Start date: 5/30/1997   • Smokeless tobacco: Never Used   • Tobacco comment: CAFFEINE USE/ ONE CUP IN THE MORNING.    Substance and Sexual Activity   • Alcohol use: Yes     " Comment: SOCIAL USE    • Drug use: No       ALLERGIES  Sertraline      Current Facility-Administered Medications:   •  aspirin tablet 325 mg, 325 mg, Oral, Once, Dio Hoang MD  •  Insert peripheral IV, , , Once **AND** sodium chloride 0.9 % flush 10 mL, 10 mL, Intravenous, PRN, Shante Tejeda, PA-C    Current Outpatient Medications:   •  aspirin 81 MG chewable tablet, Chew 81 mg Daily., Disp: , Rfl:   •  atenolol (TENORMIN) 25 MG tablet, Take one tablet daily., Disp: 90 tablet, Rfl: 3  •  Hydrocodone-Acetaminophen (LORTAB )  MG per tablet, Take  by mouth 2 (two) times a day., Disp: , Rfl:   •  meclizine (ANTIVERT) 25 MG tablet, Take 2 tablets by mouth Every 6 (Six) Hours As Needed for dizziness., Disp: 30 tablet, Rfl: 0  •  omeprazole (priLOSEC) 20 MG capsule, Take 20 mg by mouth Daily., Disp: , Rfl:     PHYSICAL EXAM  ED Triage Vitals [03/20/19 2056]   Temp Heart Rate Resp BP SpO2   98.3 °F (36.8 °C) 60 14 173/95 95 %      Temp src Heart Rate Source Patient Position BP Location FiO2 (%)   Oral Monitor Lying Right arm --       Physical Exam   Constitutional: He is oriented to person, place, and time and well-developed, well-nourished, and in no distress. No distress.   HENT:   Head: Normocephalic and atraumatic.   Right Ear: External ear normal.   Left Ear: External ear normal.   Mouth/Throat: Oropharynx is clear and moist.   Eyes: Pupils are equal, round, and reactive to light. Left eye exhibits abnormal extraocular motion (Sluggish compared to right eye. Deviates to the left.).   Neck: Normal range of motion. Neck supple.   Cardiovascular: Normal rate, regular rhythm, normal heart sounds and intact distal pulses.   Pulmonary/Chest: Effort normal and breath sounds normal. No respiratory distress.   Abdominal: Soft. Bowel sounds are normal. He exhibits no distension. There is no tenderness.   Musculoskeletal: Normal range of motion. He exhibits no deformity.   Neurological: He is alert and  oriented to person, place, and time. He has normal strength and normal reflexes. He is not agitated and not disoriented. He displays facial asymmetry (Left ptosis). He displays normal speech and normal reflexes. A cranial nerve deficit (CN VI palsy) is present. No sensory deficit. He has a normal Finger-Nose-Finger Test and a normal Heel to Gonzalez Test. He shows no pronator drift. Gait normal.   Skin: Skin is warm and dry. He is not diaphoretic.   Psychiatric: Mood, memory, affect and judgment normal.         LAB RESULTS  Results for orders placed or performed during the hospital encounter of 03/20/19   Protime-INR   Result Value Ref Range    Protime 12.8 12.1 - 15.0 Seconds    INR 0.99 0.90 - 1.10   aPTT   Result Value Ref Range    PTT 25.6 24.3 - 38.1 seconds   Comprehensive Metabolic Panel   Result Value Ref Range    Glucose 92 65 - 99 mg/dL    BUN 14 8 - 23 mg/dL    Creatinine 0.91 0.76 - 1.27 mg/dL    Sodium 143 136 - 145 mmol/L    Potassium 4.1 3.5 - 5.2 mmol/L    Chloride 105 98 - 107 mmol/L    CO2 26.5 22.0 - 29.0 mmol/L    Calcium 8.9 8.6 - 10.5 mg/dL    Total Protein 6.7 6.0 - 8.5 g/dL    Albumin 3.90 3.50 - 5.20 g/dL    ALT (SGPT) 31 1 - 41 U/L    AST (SGOT) 25 1 - 40 U/L    Alkaline Phosphatase 44 39 - 117 U/L    Total Bilirubin 0.4 0.2 - 1.2 mg/dL    eGFR Non African Amer 82 >60 mL/min/1.73    Globulin 2.8 gm/dL    A/G Ratio 1.4 g/dL    BUN/Creatinine Ratio 15.4 7.0 - 25.0    Anion Gap 11.5 mmol/L   CBC Auto Differential   Result Value Ref Range    WBC 7.48 3.40 - 10.80 10*3/mm3    RBC 5.07 4.14 - 5.80 10*6/mm3    Hemoglobin 16.0 13.0 - 17.7 g/dL    Hematocrit 46.9 37.5 - 51.0 %    MCV 92.5 79.0 - 97.0 fL    MCH 31.6 26.6 - 33.0 pg    MCHC 34.1 31.5 - 35.7 g/dL    RDW 12.5 12.3 - 15.4 %    RDW-SD 43.0 37.0 - 54.0 fl    MPV 10.0 6.0 - 12.0 fL    Platelets 186 140 - 450 10*3/mm3    Neutrophil % 63.6 42.7 - 76.0 %    Lymphocyte % 22.1 19.6 - 45.3 %    Monocyte % 7.9 5.0 - 12.0 %    Eosinophil % 4.8 0.3 - 6.2  %    Basophil % 1.2 0.0 - 1.5 %    Immature Grans % 0.4 0.0 - 0.5 %    Neutrophils, Absolute 4.76 1.40 - 7.00 10*3/mm3    Lymphocytes, Absolute 1.65 0.70 - 3.10 10*3/mm3    Monocytes, Absolute 0.59 0.10 - 0.90 10*3/mm3    Eosinophils, Absolute 0.36 0.00 - 0.40 10*3/mm3    Basophils, Absolute 0.09 0.00 - 0.20 10*3/mm3    Immature Grans, Absolute 0.03 0.00 - 0.05 10*3/mm3    nRBC 0.0 0.0 - 0.0 /100 WBC         I ordered the above labs and reviewed the results    RADIOLOGY    RADIOLOGY        Study: CT head without contrast    Findings: Normal    Interpreted contemporaneously with treatment by Dr. Harris, independently viewed by me      I ordered the above radiologic testing and reviewed the results      RADIOLOGY        Study: CXR    Findings: No active disease.  Cardiac pacemaker.    Independently viewed by me.  Interpreted contemporaneously by Dr. Hoang and myself.          PROCEDURES  Procedures      PROGRESS AND CONSULTS  ED Course as of Mar 21 0000   Wed Mar 20, 2019   2200 EKG         EKG time / Interpretation time: 2151/2153  Rhythm/Rate: 60, atrial-ventricular dual paced rhythm   OH: 168  QRS, axis: -70  QTc 476  ST and T waves: Paced rhythm  Interpreted Contemporaneously by me, independently viewed by me and MD.  Unchanged compared to prior EKG on 9/10/2017    [KS]   7970 Reviewed the EKG and labs and head CT and chest x-ray from this patient's encounter.  I evaluated the patient along with the PA, Alicia Tejeda, and I agree with her assessment as documented.  Patient's presentation was rather difficult to interpret precisely in terms of isolating the cranial nerve palsy affecting his diplopia.  Initial clinical impression was that it is likely a left cranial nerve VI palsy.  However, on further investigation I wonder if there may be a component of right cranial nerve III palsy instead or possibly even in addition to the left-sided findings.  I explained the test results to the patient to the best of my  abilities.  I explained to him that we need to admit him tonight for further testing and neurology consultation.  Patient was agreeable to this plan.  [KIMBERLYN]      ED Course User Index  [KIMBERLYN] Dio Hoang MD  [KS] Shante Tejeda PA-C           MEDICAL DECISION MAKING  Results were reviewed/discussed with the patient and they were also made aware of online access. Pt also made aware that some labs, such as cultures, will not be resulted during ER visit and follow up with PMD is necessary.     MDM  Number of Diagnoses or Management Options     Amount and/or Complexity of Data Reviewed  Clinical lab tests: reviewed and ordered  Tests in the radiology section of CPT®: reviewed and ordered  Tests in the medicine section of CPT®: reviewed and ordered    Risk of Complications, Morbidity, and/or Mortality  Presenting problems: moderate  Diagnostic procedures: moderate  Management options: moderate    Patient Progress  Patient progress: stable         DIAGNOSIS  Final diagnoses:   Diplopia   Cranial nerve palsy   Dizziness       Latest Documented Vital Signs:  As of 12:00 AM  BP- 172/97 HR- 60 Temp- 98.3 °F (36.8 °C) (Oral) O2 sat- 95%    DISPOSITION         Medication List      No changes were made to your prescriptions during this visit.                 Dictated utilizing Dragon dictation       Dio Hoang MD  03/21/19 0000

## 2019-03-22 ENCOUNTER — READMISSION MANAGEMENT (OUTPATIENT)
Dept: CALL CENTER | Facility: HOSPITAL | Age: 74
End: 2019-03-22

## 2019-03-25 ENCOUNTER — READMISSION MANAGEMENT (OUTPATIENT)
Dept: CALL CENTER | Facility: HOSPITAL | Age: 74
End: 2019-03-25

## 2019-03-25 NOTE — OUTREACH NOTE
LAG < 7 Survey      Responses   Facility patient discharged from?  [facility]   Does the patient have one of the following disease processes/diagnoses(primary or secondary)?  Other   Is there a successful TCM telephone encounter documented?  No   BHLAG <7 Attempt successful?  No   Unsuccessful attempts  Attempt 1          Dakota Sánchez RN

## 2019-03-26 ENCOUNTER — READMISSION MANAGEMENT (OUTPATIENT)
Dept: CALL CENTER | Facility: HOSPITAL | Age: 74
End: 2019-03-26

## 2019-03-26 NOTE — OUTREACH NOTE
LAG < 7 Survey      Responses   Facility patient discharged from?  [facility]   Does the patient have one of the following disease processes/diagnoses(primary or secondary)?  Other   Is there a successful TCM telephone encounter documented?  No   BHLAG <7 Attempt successful?  No   Unsuccessful attempts  Attempt 2          Jesica Whitley RN

## 2019-03-27 ENCOUNTER — READMISSION MANAGEMENT (OUTPATIENT)
Dept: CALL CENTER | Facility: HOSPITAL | Age: 74
End: 2019-03-27

## 2019-03-27 NOTE — OUTREACH NOTE
LAG < 7 Survey      Responses   Facility patient discharged from?  [facility]   Does the patient have one of the following disease processes/diagnoses(primary or secondary)?  Other   Is there a successful TCM telephone encounter documented?  No   BHLAG <7 Attempt successful?  No   Unsuccessful attempts  Attempt 3          Madison Vasques RN

## 2019-04-24 ENCOUNTER — TELEPHONE (OUTPATIENT)
Dept: CARDIOLOGY | Facility: CLINIC | Age: 74
End: 2019-04-24

## 2019-04-24 NOTE — TELEPHONE ENCOUNTER
Patient left a voicemail at 3:57pm that he has been having dizzy spells and believes this could be due to Atenolol.  Patient asked that he get a call back today.  I called patient at 4:13pm to get more information and he did not answer.  I left a voicemail for him to call back.  Libby

## 2019-04-25 RX ORDER — HYDROCODONE BITARTRATE AND ACETAMINOPHEN 10; 325 MG/1; MG/1
TABLET ORAL 3 TIMES DAILY
COMMUNITY
Start: 2018-02-22 | End: 2021-01-01

## 2019-04-25 NOTE — TELEPHONE ENCOUNTER
I just looked and he is yash on 5/9/19 at [facility] office.  He was due for his in office check in Feb, but it had been set up at the [facility] and he went to the [facility] office.  When he was checked 8/2018 remaining longevity was 1.5 years, lead testing was normal, but he is pacemaker dependent.  I tried both his numbers, no answer. If you want me to try to call and move to [facility] office sooner let me know.

## 2019-04-25 NOTE — TELEPHONE ENCOUNTER
Pt accepted appt with Dr. Alarcon this Tues 04/30 at 1415.  PM is not there until 05/09.  I have sent a message to the PM department to see if they can do a remote prior to his appt.

## 2019-04-25 NOTE — TELEPHONE ENCOUNTER
Yes increase atenolol to 2 tabs daily.  Let's see him Tuesday.  Will need PM check the next time Lin is out there.    JUDY

## 2019-04-25 NOTE — TELEPHONE ENCOUNTER
Patient's device does not have the capability to do remotes. If he needs to be seen sooner would need to be scheduled at this office.

## 2019-04-29 ENCOUNTER — CLINICAL SUPPORT NO REQUIREMENTS (OUTPATIENT)
Dept: CARDIOLOGY | Facility: CLINIC | Age: 74
End: 2019-04-29

## 2019-04-29 DIAGNOSIS — I44.2 COMPLETE HEART BLOCK (HCC): Primary | ICD-10-CM

## 2019-04-29 PROCEDURE — 93280 PM DEVICE PROGR EVAL DUAL: CPT | Performed by: INTERNAL MEDICINE

## 2019-04-30 ENCOUNTER — OFFICE VISIT (OUTPATIENT)
Dept: CARDIOLOGY | Facility: CLINIC | Age: 74
End: 2019-04-30

## 2019-04-30 VITALS
DIASTOLIC BLOOD PRESSURE: 68 MMHG | BODY MASS INDEX: 37.91 KG/M2 | HEART RATE: 59 BPM | WEIGHT: 279.9 LBS | HEIGHT: 72 IN | SYSTOLIC BLOOD PRESSURE: 116 MMHG

## 2019-04-30 DIAGNOSIS — Z95.0 HISTORY OF PACEMAKER: ICD-10-CM

## 2019-04-30 DIAGNOSIS — R42 LIGHTHEADEDNESS: ICD-10-CM

## 2019-04-30 DIAGNOSIS — I44.2 COMPLETE ATRIOVENTRICULAR BLOCK (HCC): Primary | ICD-10-CM

## 2019-04-30 DIAGNOSIS — I10 ESSENTIAL HYPERTENSION: ICD-10-CM

## 2019-04-30 PROBLEM — H53.2 DIPLOPIA: Status: RESOLVED | Noted: 2019-03-20 | Resolved: 2019-04-30

## 2019-04-30 PROCEDURE — 99214 OFFICE O/P EST MOD 30 MIN: CPT | Performed by: INTERNAL MEDICINE

## 2019-04-30 RX ORDER — ALLOPURINOL 100 MG/1
100 TABLET ORAL DAILY
COMMUNITY
Start: 2019-04-09 | End: 2020-06-22

## 2019-04-30 NOTE — PROGRESS NOTES
Date of Office Visit: 2019  Encounter Provider: Reji Alarcon MD  Place of Service: [facility] CARDIOLOGY  Patient Name: Felipe Lucas  :1945    Chief Complaint   Patient presents with   • Dizziness   :     HPI: Felipe Lucas is a 73 y.o. male who presents today for a sick visit.      He has a history of complete AV block and syncope and had a pacemaker implanted  (dual-chamber [employer]). He has had no events since then and we have been checking his pacemaker in our device clinic in [place].      When I saw him in 2015, he noted significant shortness of breath with exertion. I recommended a nuclear stress test and an echocardiogram but he declined due to cost. When I saw him again in 2016, he again noted these symptoms.  I again recommended testing but it was declined due to cost.      I did start atenolol in  for hypertension and anxiety.      He called last week with episodic lightheadedness for several weeks.  If he turns his head or stands up quickly he gets lightheaded.  He went to the ED twice and was diagnosed with vertigo.  He was prescribed meclizine but it made things worse.  He has not had any spinning sensation.  He denies PND, orthopnea, edema, syncope, or palpitations.  He continues to have exertional dyspnea but denies chest pain.     His BP was very elevated in the ED, and it was last week when he called.  I doubled his atenolol and he's been on this dose for four days. He hasn't had any lightheadedness for the last two days.     Past Medical History:   Diagnosis Date   • Chronic anxiety    • Chronic lower back pain    • Complete atrioventricular block (CMS/HCC)     s/p dual chamber pacemaker   • Gout    • Hypertension        Past Surgical History:   Procedure Laterality Date   • INSERT / REPLACE / REMOVE PACEMAKER     • MULTIPLE TOOTH EXTRACTIONS     • PACEMAKER IMPLANTATION      [employer] dual chamber, 2011  "      Social History     Socioeconomic History   • Marital status:      Spouse name: Not on file   • Number of children: Not on file   • Years of education: Not on file   • Highest education level: Not on file   Tobacco Use   • Smoking status: Former Smoker     Start date: 5/30/1997   • Smokeless tobacco: Never Used   • Tobacco comment: CAFFEINE USE/ ONE CUP IN THE MORNING.    Substance and Sexual Activity   • Alcohol use: Yes     Binge frequency: Less than monthly     Comment: SOCIAL USE    • Drug use: No       History reviewed. No pertinent family history.    Review of Systems   Constitution: Positive for malaise/fatigue.   Cardiovascular: Positive for dyspnea on exertion. Negative for chest pain and palpitations.   Musculoskeletal: Positive for back pain, joint pain and joint swelling.   Neurological: Positive for light-headedness. Negative for dizziness.   All other systems reviewed and are negative.      Allergies   Allergen Reactions   • Sertraline Other (See Comments)     Makes heart race   • Meloxicam Unknown (See Comments)     GI upset         Current Outpatient Medications:   •  allopurinol (ZYLOPRIM) 100 MG tablet, Take 100 mg by mouth Daily., Disp: , Rfl:   •  aspirin 81 MG chewable tablet, Chew 81 mg Daily., Disp: , Rfl:   •  atenolol (TENORMIN) 25 MG tablet, Take one tablet daily., Disp: 90 tablet, Rfl: 3  •  HYDROcodone-acetaminophen (NORCO)  MG per tablet, 3 (Three) Times a Day., Disp: , Rfl:   •  omeprazole (priLOSEC) 20 MG capsule, Take 20 mg by mouth Daily., Disp: , Rfl:      Objective:     Vitals:    04/30/19 1414   BP: 116/68   BP Location: Right arm   Pulse: 59   Weight: 127 kg (279 lb 14.4 oz)   Height: 182.9 cm (72\")     Body mass index is 37.96 kg/m².    Physical Exam   Constitutional: He is oriented to person, place, and time.   Obese   HENT:   Head: Normocephalic.   Nose: Nose normal.   Mouth/Throat: Oropharynx is clear and moist.   Eyes: Conjunctivae and EOM are normal. " Pupils are equal, round, and reactive to light.   Neck: Normal range of motion.   Cannot assess for JVD due to habitus   Cardiovascular: Normal rate, regular rhythm, normal heart sounds and intact distal pulses.   No murmur heard.  Pulmonary/Chest: Effort normal.   Abdominal: Soft.   Obesity limits abdominal exam   Musculoskeletal: Normal range of motion. He exhibits no edema.   Neurological: He is alert and oriented to person, place, and time. No cranial nerve deficit.   Skin: Skin is warm and dry. No rash noted.   Psychiatric: He has a normal mood and affect. His behavior is normal. Judgment and thought content normal.   Vitals reviewed.      Procedures -- EKG from March -- V-paced, no change      Assessment:       Diagnosis Plan   1. Complete atrioventricular block (CMS/HCC)     2. History of pacemaker     3. Essential hypertension     4. Lightheadedness            Plan:       1/2.  He has a history of complete heart block s/p PPM.  We follow this in our office and there have been no issues.    3/4.  I suspect his lightheadedness was due to uncontrolled hypertension.  After increasing atenolol, his BP is improved as are his symptoms.  No rhythm abnormalities were seen on device interrogation.      He has had chronic exertional dyspnea and has declined stress testing due to cost.    Sincerely,       Reji Alarcon MD

## 2019-06-07 ENCOUNTER — TELEPHONE (OUTPATIENT)
Dept: CARDIOLOGY | Facility: CLINIC | Age: 74
End: 2019-06-07

## 2019-06-07 RX ORDER — ATENOLOL 50 MG/1
50 TABLET ORAL DAILY
Qty: 90 TABLET | Refills: 3 | Status: SHIPPED | OUTPATIENT
Start: 2019-06-07 | End: 2019-08-06 | Stop reason: SDUPTHER

## 2019-06-07 NOTE — TELEPHONE ENCOUNTER
Pt called and said that dr baires was upping his atenolol 25 mg to 50 mg and that she was going to send in a RX. He said that they did not have a RX for the 50mg. He would like to know if you would be able to send it in to the [facility] in [place].

## 2019-06-07 NOTE — TELEPHONE ENCOUNTER
Reji Alarcon MD   to Me        2:04 PM   Note      Yes increase atenolol to 2 tabs daily.  Let's see him Tuesday.  Will need PM check the next time Lin is out there.     JDK        Note from 04/25/19

## 2019-07-22 NOTE — PROGRESS NOTES
Subjective:     Encounter Date:07/23/2019      Patient ID: Felipe Lucas is a 73 y.o. male.    Chief Complaint: 3 month follow up  History of Present Illness     He is a new patient to me and I reviewed his past medical records.  He is patient of Dr. Alarcon.  He has a history of complete AV block and syncope and had a pacemaker implanted 2011 (dual-chamber [employer]). He has had no events since then.  He is followed in our device clinic with regular checks.       He was seen in July 2015, he noted significant shortness of breath with exertion. A nuclear stress test and an echocardiogram were recommended, but he declined due to cost. When he was seen again in March 2016, he again noted these symptoms.  Again testing was recommended, but it was declined due to cost.    He was started on atenolol in 2016 for hypertension and anxiety.      He called in March 2019 with episodic lightheadedness for several weeks.  If he turned his head or stood up quickly he would get lightheaded.  He went to the ED twice and was diagnosed with vertigo.  He was prescribed meclizine but it made things worse.  He has not had any spinning sensation.  He denies PND, orthopnea, edema, syncope, or palpitations.  He continues to have exertional dyspnea but denies chest pain.      His BP was very elevated in the ED, and it was last week when he called.  He is atenolol was to 50 mg daily.    He presents today, 7/23/19, in follow up for his blood pressure.  He denies any palpitations, edema, chest pain or chest tightness.  He said no episodes of lightheadedness or dizziness.  He does have some shortness of breath with exertion.  He complains of fatigue that is the same and unchanged.  He denies any unexplained leg pain, numbness or tingling in his upper or lower extremities.  He denies any PND, cough or orthopnea.  He has had no unexplained bleeding.  He is taking his medications as directed.  He states he may snore a little bit  "but his wife is never complained.  He states he does not rest well at night and really wants a sleeping pill.  He feels that he sleeps \"okay\", but cannot really tell me if he feels rested in the mornings.  He is never had a sleep study and refuses at this time.  He feels that if he had a sleeping pill everything would be okay and that there is really not a need for sleep study to be performed.     He checks his blood pressure at home several times a week.  He has a wrist cuff.  He states it is normally 150/170s-80/90s.  He states it is never as good as it was in the office today.      The following portions of the patient's history were reviewed and updated as appropriate: allergies, current medications, past family history, past medical history, past social history, past surgical history and problem list.    Review of Systems   Constitution: Negative for weakness and malaise/fatigue.   HENT: Negative for congestion, hoarse voice and sore throat.    Eyes: Negative for blurred vision, double vision, photophobia, vision loss in left eye and vision loss in right eye.   Cardiovascular: Negative for chest pain, dyspnea on exertion, irregular heartbeat, leg swelling, near-syncope, orthopnea, palpitations, paroxysmal nocturnal dyspnea and syncope.   Respiratory: Negative for cough, hemoptysis, shortness of breath with exertion, sleep disturbances due to breathing, snoring, sputum production and wheezing.    Endocrine: Negative.    Hematologic/Lymphatic: Does not bruise/bleed easily.   Skin: Negative for color change, dry skin, poor wound healing and rash.   Musculoskeletal: Negative for back pain, falls, gout, joint pain, joint swelling, muscle cramps and muscle weakness.   Gastrointestinal: Negative for abdominal pain, constipation, diarrhea, dysphagia, melena, nausea and vomiting.   Neurological: Negative for excessive daytime sleepiness, dizziness, headaches, light-headedness, loss of balance, numbness, paresthesias, " seizures and vertigo.   Psychiatric/Behavioral: Negative for depression and substance abuse. The patient is not nervous/anxious.        ECG 12 Lead  Date/Time: 7/23/2019 10:09 AM  Performed by: Megan Wolf APRN  Authorized by: Megan Wolf APRN   Comparison: compared with previous ECG from 3/20/2019  Similar to previous ECG  Rhythm: paced  Rate: normal  QRS axis: left    Clinical impression: abnormal EKG               Objective:         Physical Exam   Constitutional: He is oriented to person, place, and time. Vital signs are normal. He appears well-developed and well-nourished. No distress.   HENT:   Head: Normocephalic and atraumatic.   Right Ear: Hearing normal.   Left Ear: Hearing normal.   Eyes: Conjunctivae and lids are normal.   Neck: Normal range of motion. Neck supple. No JVD present. Carotid bruit is not present. No thyromegaly present.   Cardiovascular: Normal rate, regular rhythm, S1 normal, S2 normal, normal heart sounds and intact distal pulses.  PMI is not displaced.  Exam reveals no gallop.    No murmur heard.  Pulses:       Carotid pulses are 2+ on the right side, and 2+ on the left side.       Radial pulses are 2+ on the right side, and 2+ on the left side.        Dorsalis pedis pulses are 2+ on the right side, and 2+ on the left side.        Posterior tibial pulses are 2+ on the right side, and 2+ on the left side.   Pulmonary/Chest: Effort normal and breath sounds normal. No respiratory distress. He has no wheezes. He has no rhonchi. He has no rales. He exhibits no tenderness.   Abdominal: Soft. Normal appearance and bowel sounds are normal. He exhibits no distension, no abdominal bruit and no mass. There is no tenderness.   Musculoskeletal: Normal range of motion.   Exhibits no edema or deformity   Lymphadenopathy:     He has no cervical adenopathy.   Neurological: He is alert and oriented to person, place, and time. No cranial nerve deficit. Coordination and gait normal.   Oriented to  "person, place and time.   Skin: Skin is warm, dry and intact. No rash noted. He is not diaphoretic. No cyanosis. Nails show no clubbing.   Psychiatric: He has a normal mood and affect. His speech is normal and behavior is normal. Judgment and thought content normal. Cognition and memory are normal.     Vitals:    07/23/19 1002   BP: 136/82   BP Location: Right arm   Patient Position: Sitting   Cuff Size: Large Adult   Pulse: 60   Resp: 16   SpO2: 95%   Weight: 130 kg (286 lb 9.6 oz)   Height: 182.9 cm (72\")           Lab Review:       Assessment:          Diagnosis Plan   1. Essential hypertension  Basic Metabolic Panel   2. Complete atrioventricular block (CMS/HCC)     3. History of pacemaker            Plan:       1.  Essential hypertension- per his wrist cuff he states his systolic is usually 150s/170s-80s/90s.  We will add chlorthalidone 25 mg daily.  He will come in between 1 and 2 weeks for a blood pressure check and a BMP.  If he has good response we will then switch him over to the combination pill of the atenolol/chlorthalidone.  2/3.  Complete atrioventricular block/history of pacemaker- he is followed in our device clinic.    Blood pressure check in 1-2 weeks with a BMP.    TANVI Ochoa      Current Outpatient Medications:   •  allopurinol (ZYLOPRIM) 100 MG tablet, Take 100 mg by mouth Daily., Disp: , Rfl:   •  aspirin 81 MG chewable tablet, Chew 81 mg Daily., Disp: , Rfl:   •  atenolol (TENORMIN) 50 MG tablet, Take 1 tablet by mouth Daily., Disp: 90 tablet, Rfl: 3  •  HYDROcodone-acetaminophen (NORCO)  MG per tablet, 3 (Three) Times a Day., Disp: , Rfl:   •  omeprazole (priLOSEC) 20 MG capsule, Take 20 mg by mouth Daily., Disp: , Rfl:   •  chlorthalidone (HYGROTON) 25 MG tablet, Take 1 tablet by mouth Daily., Disp: 30 tablet, Rfl: 5           "

## 2019-07-23 ENCOUNTER — OFFICE VISIT (OUTPATIENT)
Dept: CARDIOLOGY | Facility: CLINIC | Age: 74
End: 2019-07-23

## 2019-07-23 VITALS
OXYGEN SATURATION: 95 % | HEIGHT: 72 IN | HEART RATE: 60 BPM | WEIGHT: 286.6 LBS | SYSTOLIC BLOOD PRESSURE: 136 MMHG | DIASTOLIC BLOOD PRESSURE: 82 MMHG | BODY MASS INDEX: 38.82 KG/M2 | RESPIRATION RATE: 16 BRPM

## 2019-07-23 DIAGNOSIS — I10 ESSENTIAL HYPERTENSION: Primary | ICD-10-CM

## 2019-07-23 DIAGNOSIS — Z95.0 HISTORY OF PACEMAKER: ICD-10-CM

## 2019-07-23 DIAGNOSIS — I44.2 COMPLETE ATRIOVENTRICULAR BLOCK (HCC): ICD-10-CM

## 2019-07-23 PROCEDURE — 93000 ELECTROCARDIOGRAM COMPLETE: CPT | Performed by: NURSE PRACTITIONER

## 2019-07-23 PROCEDURE — 99214 OFFICE O/P EST MOD 30 MIN: CPT | Performed by: NURSE PRACTITIONER

## 2019-07-23 RX ORDER — CHLORTHALIDONE 25 MG/1
25 TABLET ORAL DAILY
Qty: 30 TABLET | Refills: 5 | Status: SHIPPED | OUTPATIENT
Start: 2019-07-23 | End: 2020-02-17

## 2019-08-06 ENCOUNTER — CLINICAL SUPPORT (OUTPATIENT)
Dept: CARDIOLOGY | Facility: CLINIC | Age: 74
End: 2019-08-06

## 2019-08-06 ENCOUNTER — LAB (OUTPATIENT)
Dept: LAB | Facility: HOSPITAL | Age: 74
End: 2019-08-06

## 2019-08-06 DIAGNOSIS — I10 ESSENTIAL HYPERTENSION: ICD-10-CM

## 2019-08-06 DIAGNOSIS — I10 ESSENTIAL HYPERTENSION: Primary | ICD-10-CM

## 2019-08-06 LAB
ANION GAP SERPL CALCULATED.3IONS-SCNC: 14.2 MMOL/L (ref 5–15)
BUN BLD-MCNC: 15 MG/DL (ref 8–23)
BUN/CREAT SERPL: 14.2 (ref 7–25)
CALCIUM SPEC-SCNC: 9.4 MG/DL (ref 8.6–10.5)
CHLORIDE SERPL-SCNC: 96 MMOL/L (ref 98–107)
CO2 SERPL-SCNC: 28.8 MMOL/L (ref 22–29)
CREAT BLD-MCNC: 1.06 MG/DL (ref 0.76–1.27)
GFR SERPL CREATININE-BSD FRML MDRD: 68 ML/MIN/1.73
GLUCOSE BLD-MCNC: 121 MG/DL (ref 65–99)
POTASSIUM BLD-SCNC: 4.1 MMOL/L (ref 3.5–5.2)
SODIUM BLD-SCNC: 139 MMOL/L (ref 136–145)

## 2019-08-06 PROCEDURE — 80048 BASIC METABOLIC PNL TOTAL CA: CPT

## 2019-08-06 PROCEDURE — 36415 COLL VENOUS BLD VENIPUNCTURE: CPT

## 2019-08-06 RX ORDER — ATENOLOL 100 MG/1
100 TABLET ORAL DAILY
Qty: 90 TABLET | Refills: 3 | Status: SHIPPED | OUTPATIENT
Start: 2019-08-06 | End: 2020-01-01 | Stop reason: SDUPTHER

## 2019-08-06 NOTE — PROGRESS NOTES
Patient presents today for blood pressure check.  His blood pressure still elevated at 166/88.  He informed the MA that he was only taking a half a tablet of the chlorthalidone because if he takes a whole tablet it upsets his stomach.  He did not  call the office and let us know that he was having trouble tolerating the medication.  We will increase his atenolol up to 100 mg a day and continue the 12.5 of the chlorthalidone.  He is to have his BMP checked today.  He will come back in in 2 weeks for another blood pressure check.

## 2019-08-08 ENCOUNTER — CLINICAL SUPPORT NO REQUIREMENTS (OUTPATIENT)
Dept: CARDIOLOGY | Facility: CLINIC | Age: 74
End: 2019-08-08

## 2019-08-08 DIAGNOSIS — I44.2 COMPLETE HEART BLOCK (HCC): Primary | ICD-10-CM

## 2019-08-08 PROCEDURE — 93293 PM PHONE R-STRIP DEVICE EVAL: CPT | Performed by: INTERNAL MEDICINE

## 2019-11-15 ENCOUNTER — CLINICAL SUPPORT NO REQUIREMENTS (OUTPATIENT)
Dept: CARDIOLOGY | Facility: CLINIC | Age: 74
End: 2019-11-15

## 2019-11-15 DIAGNOSIS — I44.2 THIRD DEGREE AV BLOCK (HCC): Primary | ICD-10-CM

## 2019-11-15 PROCEDURE — 93280 PM DEVICE PROGR EVAL DUAL: CPT | Performed by: INTERNAL MEDICINE

## 2020-01-01 ENCOUNTER — CLINICAL SUPPORT NO REQUIREMENTS (OUTPATIENT)
Dept: CARDIOLOGY | Facility: CLINIC | Age: 75
End: 2020-01-01

## 2020-01-01 ENCOUNTER — OFFICE VISIT (OUTPATIENT)
Dept: CARDIOLOGY | Facility: CLINIC | Age: 75
End: 2020-01-01

## 2020-01-01 ENCOUNTER — TELEPHONE (OUTPATIENT)
Dept: CARDIOLOGY | Facility: CLINIC | Age: 75
End: 2020-01-01

## 2020-01-01 VITALS
SYSTOLIC BLOOD PRESSURE: 104 MMHG | HEART RATE: 63 BPM | DIASTOLIC BLOOD PRESSURE: 68 MMHG | BODY MASS INDEX: 34.31 KG/M2 | WEIGHT: 253.3 LBS | HEIGHT: 72 IN

## 2020-01-01 DIAGNOSIS — I44.2 COMPLETE HEART BLOCK (HCC): Primary | ICD-10-CM

## 2020-01-01 DIAGNOSIS — I44.2 THIRD DEGREE AV BLOCK (HCC): Primary | ICD-10-CM

## 2020-01-01 DIAGNOSIS — I44.2 COMPLETE ATRIOVENTRICULAR BLOCK (HCC): Primary | ICD-10-CM

## 2020-01-01 DIAGNOSIS — Z95.0 HISTORY OF PACEMAKER: ICD-10-CM

## 2020-01-01 DIAGNOSIS — I10 ESSENTIAL HYPERTENSION: ICD-10-CM

## 2020-01-01 PROCEDURE — 93288 INTERROG EVL PM/LDLS PM IP: CPT | Performed by: INTERNAL MEDICINE

## 2020-01-01 PROCEDURE — 99214 OFFICE O/P EST MOD 30 MIN: CPT | Performed by: INTERNAL MEDICINE

## 2020-01-01 PROCEDURE — 93000 ELECTROCARDIOGRAM COMPLETE: CPT | Performed by: INTERNAL MEDICINE

## 2020-01-01 RX ORDER — ATENOLOL 100 MG/1
100 TABLET ORAL DAILY
Qty: 90 TABLET | Refills: 3 | Status: SHIPPED | OUTPATIENT
Start: 2020-01-01 | End: 2021-01-01

## 2020-01-01 RX ORDER — CHLORTHALIDONE 25 MG/1
TABLET ORAL
Qty: 30 TABLET | Refills: 3 | Status: SHIPPED | OUTPATIENT
Start: 2020-01-01 | End: 2020-01-01

## 2020-01-01 RX ORDER — POTASSIUM CHLORIDE 1.5 G/1.77G
20 POWDER, FOR SOLUTION ORAL DAILY
COMMUNITY
Start: 2020-01-01 | End: 2020-01-01

## 2020-01-01 RX ORDER — CHLORTHALIDONE 25 MG/1
TABLET ORAL
Qty: 30 TABLET | Refills: 3 | OUTPATIENT
Start: 2020-01-01

## 2020-01-01 RX ORDER — CHLORTHALIDONE 25 MG/1
TABLET ORAL
Qty: 30 TABLET | Refills: 3 | Status: SHIPPED | OUTPATIENT
Start: 2020-01-01 | End: 2021-01-01

## 2020-01-01 RX ORDER — AMLODIPINE BESYLATE 5 MG/1
5 TABLET ORAL DAILY
Qty: 90 TABLET | Refills: 1 | Status: SHIPPED | OUTPATIENT
Start: 2020-01-01 | End: 2021-01-01 | Stop reason: SINTOL

## 2020-02-17 RX ORDER — CHLORTHALIDONE 25 MG/1
TABLET ORAL
Qty: 30 TABLET | Refills: 4 | Status: SHIPPED | OUTPATIENT
Start: 2020-02-17 | End: 2020-01-01

## 2020-03-12 ENCOUNTER — CLINICAL SUPPORT NO REQUIREMENTS (OUTPATIENT)
Dept: CARDIOLOGY | Facility: CLINIC | Age: 75
End: 2020-03-12

## 2020-03-12 DIAGNOSIS — I44.2 COMPLETE HEART BLOCK (HCC): Primary | ICD-10-CM

## 2020-03-12 PROCEDURE — 93293 PM PHONE R-STRIP DEVICE EVAL: CPT | Performed by: INTERNAL MEDICINE

## 2020-04-28 ENCOUNTER — CLINICAL SUPPORT NO REQUIREMENTS (OUTPATIENT)
Dept: CARDIOLOGY | Facility: CLINIC | Age: 75
End: 2020-04-28

## 2020-04-28 DIAGNOSIS — I44.2 COMPLETE HEART BLOCK (HCC): Primary | ICD-10-CM

## 2020-04-28 PROCEDURE — 93280 PM DEVICE PROGR EVAL DUAL: CPT | Performed by: INTERNAL MEDICINE

## 2020-06-09 ENCOUNTER — CLINICAL SUPPORT NO REQUIREMENTS (OUTPATIENT)
Dept: CARDIOLOGY | Facility: CLINIC | Age: 75
End: 2020-06-09

## 2020-06-09 DIAGNOSIS — I44.2 COMPLETE HEART BLOCK (HCC): Primary | ICD-10-CM

## 2020-06-09 PROCEDURE — 93280 PM DEVICE PROGR EVAL DUAL: CPT | Performed by: INTERNAL MEDICINE

## 2020-08-07 NOTE — TELEPHONE ENCOUNTER
----- Message from Neha Hernandez MA sent at 8/6/2020 12:29 PM EDT -----  Needs a yearly with KATHIE

## 2020-09-14 NOTE — PROGRESS NOTES
RM:________     PCP: Boyd Andres MD    : 1945  AGE: 74 y.o.  EST PATIENT   REASON FOR VISIT/  CC:        WT: ____________ BP: __________L __________R HR______    CHEST PAIN: _____________    SOA: _____________PALPS: _______________     LIGHTHEADED: ___________FATIGUE: ________________ EDEMA __________    ALLERGIES:Sertraline and Meloxicam SMOKING HISTORY:  Social History     Tobacco Use   • Smoking status: Former Smoker     Start date: 1997   • Smokeless tobacco: Never Used   • Tobacco comment: CAFFEINE USE/ ONE CUP IN THE MORNING.    Substance Use Topics   • Alcohol use: Yes     Binge frequency: Less than monthly     Comment: SOCIAL USE    • Drug use: No     CAFFEINE USE_________________  ALCOHOL ______________________    Below is the patient's most recent value for Albumin, ALT, AST, BUN, Calcium, Chloride, Cholesterol, CO2, Creatinine, GFR, Glucose, HDL, Hematocrit, Hemoglobin, Hemoglobin A1C, LDL, Magnesium, Phosphorus, Platelets, Potassium, PSA, Sodium, Triglycerides, TSH and WBC.   Lab Results   Component Value Date    ALBUMIN 3.90 2019    ALT 31 2019    AST 25 2019    BUN 15 2019    CALCIUM 9.4 2019    CL 96 (L) 2019    CO2 28.8 2019    CREATININE 1.06 2019    HCT 43.9 2019    HGB 14.6 2019     2019    K 4.1 2019     2019    WBC 6.55 2019          NEW DIAGNOSIS/ SURGERY/ HOSP OR ED VISITS: ______________________    __________________________________________________________________      RECENT LABS OR DIAGNOSTIC TESTING:  _____________________________    __________________________________________________________________      ASSESSMENT/ PLAN: _______________________________________________    __________________________________________________________________

## 2020-09-29 NOTE — PROGRESS NOTES
Date of Office Visit:   Encounter Provider: Reji Alarcon MD  Place of Service: [facility] CARDIOLOGY  Patient Name: Felipe Lucas  :1945    Chief Complaint   Patient presents with   • Irregular Heart Beat   :     HPI: Felipe Lucas is a 74 y.o. male who presents today to follow up.      He has a history of complete AV block and syncope and had a pacemaker implanted  (dual-chamber [employer]). He has had no events since then and we have been checking his pacemaker in our device clinic in [place].      When I saw him in 2015, he noted significant shortness of breath with exertion. I recommended a nuclear stress test and an echocardiogram but he declined due to cost. When I saw him again in 2016, he again noted these symptoms.  I again recommended testing but it was declined due to cost.      I did start atenolol in  for hypertension and anxiety.      Since I saw him last, his PCP started chlorthalidone for worsening hypertension.  Then he had to start oral potassium, and he hates the liquid.  He also has had terrible gout since then.     Past Medical History:   Diagnosis Date   • Chronic anxiety    • Chronic lower back pain    • Complete atrioventricular block (CMS/HCC)     s/p dual chamber pacemaker   • Gout    • Hypertension        Past Surgical History:   Procedure Laterality Date   • INSERT / REPLACE / REMOVE PACEMAKER     • MULTIPLE TOOTH EXTRACTIONS     • PACEMAKER IMPLANTATION      [employer] dual chamber, 2011       Social History     Socioeconomic History   • Marital status:      Spouse name: Not on file   • Number of children: Not on file   • Years of education: Not on file   • Highest education level: Not on file   Tobacco Use   • Smoking status: Former Smoker     Start date: 1997   • Smokeless tobacco: Never Used   • Tobacco comment: CAFFEINE USE/ ONE CUP IN THE MORNING.    Substance and Sexual Activity  "  • Alcohol use: Yes     Binge frequency: Less than monthly     Comment: SOCIAL USE    • Drug use: No   • Sexual activity: Defer       No family history on file.    Review of Systems   Constitution: Positive for malaise/fatigue.   Cardiovascular: Positive for dyspnea on exertion. Negative for chest pain and palpitations.   Musculoskeletal: Positive for back pain, joint pain and joint swelling.   Neurological: Positive for light-headedness. Negative for dizziness.   All other systems reviewed and are negative.      Allergies   Allergen Reactions   • Sertraline Other (See Comments)     Makes heart race   • Meloxicam Unknown (See Comments)     GI upset         Current Outpatient Medications:   •  aspirin 81 MG chewable tablet, Chew 81 mg Daily., Disp: , Rfl:   •  atenolol (TENORMIN) 100 MG tablet, Take 1 tablet by mouth Daily., Disp: 90 tablet, Rfl: 3  •  chlorthalidone (HYGROTON) 25 MG tablet, TAKE ONE TABLET BY MOUTH DAILY, Disp: 30 tablet, Rfl: 3  •  colchicine 0.6 MG tablet, Take 2 tabs now and then take 1 tab in 1 hour. Repeat daily as needed, Disp: 12 tablet, Rfl: 0  •  HYDROcodone-acetaminophen (NORCO)  MG per tablet, 3 (Three) Times a Day., Disp: , Rfl:   •  omeprazole (priLOSEC) 20 MG capsule, Take 20 mg by mouth Daily., Disp: , Rfl:   •  potassium chloride (KLOR-CON) 20 MEQ packet, Take 20 mEq by mouth Daily., Disp: , Rfl:      Objective:     Vitals:    09/29/20 1358   BP: 104/68   BP Location: Right arm   Pulse: 63   Weight: 115 kg (253 lb 4.8 oz)   Height: 182.9 cm (72\")     Body mass index is 34.35 kg/m².    Physical Exam   Constitutional: He is oriented to person, place, and time.   Obese   HENT:   Head: Normocephalic.   Nose: Nose normal.   Mouth/Throat: Oropharynx is clear and moist.   Eyes: Pupils are equal, round, and reactive to light. Conjunctivae and EOM are normal.   Neck: Normal range of motion.   Cannot assess for JVD due to habitus   Cardiovascular: Normal rate, regular rhythm, normal " heart sounds and intact distal pulses.   No murmur heard.  Pulmonary/Chest: Effort normal.   Abdominal: Soft.   Obesity limits abdominal exam   Musculoskeletal: Normal range of motion.         General: No edema.   Neurological: He is alert and oriented to person, place, and time. No cranial nerve deficit.   Skin: Skin is warm and dry. No rash noted.   Psychiatric: He has a normal mood and affect. His behavior is normal. Judgment and thought content normal.   Vitals reviewed.        ECG 12 Lead    Date/Time: 9/29/2020 2:09 PM  Performed by: Reji Alarcon MD  Authorized by: Reji Alarcon MD   Comparison: compared with previous ECG   Similar to previous ECG  Rhythm: paced  Conduction: non-specific intraventricular conduction delay  QRS axis: left  Pacing: dual chamber pacing  Clinical impression: abnormal EKG               Assessment:       Diagnosis Plan   1. Complete atrioventricular block (CMS/HCC)     2. History of pacemaker     3. Essential hypertension            Plan:       1/2.  He has a history of complete heart block s/p PPM.  We follow this in our office and there have been no issues.    3.  His BP is perfectly controlled.  However, the chlorthalidone is causing him to need KCL supplementation (which he dislikes) and is worsening his gout. I stopped the thiazide and the potassium, and started amlodipine 5mg daily.  If he gets leg swelling, I'll stop it and use spironolactone, which is less likely to worsen his gout.    Sincerely,       Reji Alarcon MD

## 2021-01-01 ENCOUNTER — CLINICAL SUPPORT NO REQUIREMENTS (OUTPATIENT)
Dept: CARDIOLOGY | Facility: CLINIC | Age: 76
End: 2021-01-01

## 2021-01-01 ENCOUNTER — OFFICE VISIT (OUTPATIENT)
Dept: CARDIOLOGY | Facility: CLINIC | Age: 76
End: 2021-01-01

## 2021-01-01 ENCOUNTER — HOSPITAL ENCOUNTER (EMERGENCY)
Facility: HOSPITAL | Age: 76
End: 2021-06-23
Attending: EMERGENCY MEDICINE | Admitting: EMERGENCY MEDICINE

## 2021-01-01 ENCOUNTER — LAB (OUTPATIENT)
Dept: LAB | Facility: HOSPITAL | Age: 76
End: 2021-01-01

## 2021-01-01 ENCOUNTER — TRANSCRIBE ORDERS (OUTPATIENT)
Dept: LAB | Facility: HOSPITAL | Age: 76
End: 2021-01-01

## 2021-01-01 ENCOUNTER — TRANSCRIBE ORDERS (OUTPATIENT)
Dept: CARDIOLOGY | Facility: CLINIC | Age: 76
End: 2021-01-01

## 2021-01-01 ENCOUNTER — TELEPHONE (OUTPATIENT)
Dept: CARDIOLOGY | Facility: CLINIC | Age: 76
End: 2021-01-01

## 2021-01-01 ENCOUNTER — TRANSCRIBE ORDERS (OUTPATIENT)
Dept: OBSTETRICS AND GYNECOLOGY | Facility: CLINIC | Age: 76
End: 2021-01-01

## 2021-01-01 ENCOUNTER — HOSPITAL ENCOUNTER (OUTPATIENT)
Facility: HOSPITAL | Age: 76
Setting detail: HOSPITAL OUTPATIENT SURGERY
Discharge: HOME | End: 2021-04-05
Attending: INTERNAL MEDICINE | Admitting: INTERNAL MEDICINE

## 2021-01-01 VITALS
WEIGHT: 250 LBS | BODY MASS INDEX: 33.86 KG/M2 | DIASTOLIC BLOOD PRESSURE: 78 MMHG | HEIGHT: 72 IN | SYSTOLIC BLOOD PRESSURE: 120 MMHG | HEART RATE: 60 BPM | OXYGEN SATURATION: 97 % | RESPIRATION RATE: 16 BRPM

## 2021-01-01 VITALS
SYSTOLIC BLOOD PRESSURE: 110 MMHG | BODY MASS INDEX: 37.52 KG/M2 | WEIGHT: 277 LBS | OXYGEN SATURATION: 96 % | HEIGHT: 72 IN | HEART RATE: 73 BPM | DIASTOLIC BLOOD PRESSURE: 70 MMHG | RESPIRATION RATE: 16 BRPM

## 2021-01-01 VITALS
SYSTOLIC BLOOD PRESSURE: 149 MMHG | OXYGEN SATURATION: 95 % | BODY MASS INDEX: 31.15 KG/M2 | DIASTOLIC BLOOD PRESSURE: 84 MMHG | HEART RATE: 60 BPM | WEIGHT: 230 LBS | HEIGHT: 72 IN | RESPIRATION RATE: 16 BRPM | TEMPERATURE: 98.4 F

## 2021-01-01 DIAGNOSIS — I10 ESSENTIAL HYPERTENSION: ICD-10-CM

## 2021-01-01 DIAGNOSIS — I44.2 AV BLOCK, COMPLETE (HCC): Primary | ICD-10-CM

## 2021-01-01 DIAGNOSIS — Z95.0 HISTORY OF PACEMAKER: ICD-10-CM

## 2021-01-01 DIAGNOSIS — Z01.810 PREPROCEDURAL CARDIOVASCULAR EXAMINATION: ICD-10-CM

## 2021-01-01 DIAGNOSIS — I44.2 COMPLETE ATRIOVENTRICULAR BLOCK (HCC): Primary | ICD-10-CM

## 2021-01-01 DIAGNOSIS — R07.2 PRECORDIAL PAIN: ICD-10-CM

## 2021-01-01 DIAGNOSIS — Z13.6 SCREENING FOR CARDIOVASCULAR CONDITION: ICD-10-CM

## 2021-01-01 DIAGNOSIS — Z01.818 OTHER SPECIFIED PRE-OPERATIVE EXAMINATION: ICD-10-CM

## 2021-01-01 DIAGNOSIS — I44.2 COMPLETE ATRIOVENTRICULAR BLOCK (HCC): ICD-10-CM

## 2021-01-01 DIAGNOSIS — Z13.6 SCREENING FOR CARDIOVASCULAR CONDITION: Primary | ICD-10-CM

## 2021-01-01 DIAGNOSIS — I46.9 CARDIAC ARREST (HCC): Primary | ICD-10-CM

## 2021-01-01 DIAGNOSIS — Z01.818 OTHER SPECIFIED PRE-OPERATIVE EXAMINATION: Primary | ICD-10-CM

## 2021-01-01 LAB
ANION GAP SERPL CALCULATED.3IONS-SCNC: 11.7 MMOL/L (ref 5–15)
BASOPHILS # BLD AUTO: 0.1 10*3/MM3 (ref 0–0.2)
BASOPHILS NFR BLD AUTO: 1.4 % (ref 0–1.5)
BUN SERPL-MCNC: 14 MG/DL (ref 8–23)
BUN/CREAT SERPL: 13.5 (ref 7–25)
CALCIUM SPEC-SCNC: 9.5 MG/DL (ref 8.6–10.5)
CHLORIDE SERPL-SCNC: 99 MMOL/L (ref 98–107)
CO2 SERPL-SCNC: 26.3 MMOL/L (ref 22–29)
CREAT SERPL-MCNC: 1.04 MG/DL (ref 0.76–1.27)
DEPRECATED RDW RBC AUTO: 42.7 FL (ref 37–54)
EOSINOPHIL # BLD AUTO: 0.33 10*3/MM3 (ref 0–0.4)
EOSINOPHIL NFR BLD AUTO: 4.8 % (ref 0.3–6.2)
ERYTHROCYTE [DISTWIDTH] IN BLOOD BY AUTOMATED COUNT: 11.8 % (ref 12.3–15.4)
GFR SERPL CREATININE-BSD FRML MDRD: 70 ML/MIN/1.73
GLUCOSE SERPL-MCNC: 111 MG/DL (ref 65–99)
HCT VFR BLD AUTO: 46.6 % (ref 37.5–51)
HGB BLD-MCNC: 16.1 G/DL (ref 13–17.7)
IMM GRANULOCYTES # BLD AUTO: 0.02 10*3/MM3 (ref 0–0.05)
IMM GRANULOCYTES NFR BLD AUTO: 0.3 % (ref 0–0.5)
LYMPHOCYTES # BLD AUTO: 1.61 10*3/MM3 (ref 0.7–3.1)
LYMPHOCYTES NFR BLD AUTO: 23.3 % (ref 19.6–45.3)
MCH RBC QN AUTO: 33.8 PG (ref 26.6–33)
MCHC RBC AUTO-ENTMCNC: 34.5 G/DL (ref 31.5–35.7)
MCV RBC AUTO: 97.7 FL (ref 79–97)
MONOCYTES # BLD AUTO: 0.75 10*3/MM3 (ref 0.1–0.9)
MONOCYTES NFR BLD AUTO: 10.9 % (ref 5–12)
NEUTROPHILS NFR BLD AUTO: 4.09 10*3/MM3 (ref 1.7–7)
NEUTROPHILS NFR BLD AUTO: 59.3 % (ref 42.7–76)
NRBC BLD AUTO-RTO: 0 /100 WBC (ref 0–0.2)
PLATELET # BLD AUTO: 175 10*3/MM3 (ref 140–450)
PMV BLD AUTO: 10.7 FL (ref 6–12)
POTASSIUM SERPL-SCNC: 4.4 MMOL/L (ref 3.5–5.2)
RBC # BLD AUTO: 4.77 10*6/MM3 (ref 4.14–5.8)
SARS-COV-2 RNA PNL SPEC NAA+PROBE: NOT DETECTED
SARS-COV-2 RNA PNL SPEC NAA+PROBE: NOT DETECTED
SODIUM SERPL-SCNC: 137 MMOL/L (ref 136–145)
WBC # BLD AUTO: 6.9 10*3/MM3 (ref 3.4–10.8)

## 2021-01-01 PROCEDURE — 87635 SARS-COV-2 COVID-19 AMP PRB: CPT

## 2021-01-01 PROCEDURE — 80048 BASIC METABOLIC PNL TOTAL CA: CPT

## 2021-01-01 PROCEDURE — 25010000003 LIDOCAINE 1 % SOLUTION: Performed by: INTERNAL MEDICINE

## 2021-01-01 PROCEDURE — 93000 ELECTROCARDIOGRAM COMPLETE: CPT | Performed by: NURSE PRACTITIONER

## 2021-01-01 PROCEDURE — 92950 HEART/LUNG RESUSCITATION CPR: CPT

## 2021-01-01 PROCEDURE — 36415 COLL VENOUS BLD VENIPUNCTURE: CPT

## 2021-01-01 PROCEDURE — 25010000002 EPINEPHRINE 1 MG/10ML SOLUTION PREFILLED SYRINGE: Performed by: EMERGENCY MEDICINE

## 2021-01-01 PROCEDURE — 99284 EMERGENCY DEPT VISIT MOD MDM: CPT

## 2021-01-01 PROCEDURE — C1785 PMKR, DUAL, RATE-RESP: HCPCS | Performed by: INTERNAL MEDICINE

## 2021-01-01 PROCEDURE — 99214 OFFICE O/P EST MOD 30 MIN: CPT | Performed by: NURSE PRACTITIONER

## 2021-01-01 PROCEDURE — 93280 PM DEVICE PROGR EVAL DUAL: CPT | Performed by: INTERNAL MEDICINE

## 2021-01-01 PROCEDURE — 99285 EMERGENCY DEPT VISIT HI MDM: CPT | Performed by: EMERGENCY MEDICINE

## 2021-01-01 PROCEDURE — 99152 MOD SED SAME PHYS/QHP 5/>YRS: CPT | Performed by: INTERNAL MEDICINE

## 2021-01-01 PROCEDURE — 25010000002 FENTANYL CITRATE (PF) 100 MCG/2ML SOLUTION: Performed by: INTERNAL MEDICINE

## 2021-01-01 PROCEDURE — C9803 HOPD COVID-19 SPEC COLLECT: HCPCS

## 2021-01-01 PROCEDURE — 85025 COMPLETE CBC W/AUTO DIFF WBC: CPT

## 2021-01-01 PROCEDURE — 25010000002 MIDAZOLAM PER 1 MG: Performed by: INTERNAL MEDICINE

## 2021-01-01 PROCEDURE — 99153 MOD SED SAME PHYS/QHP EA: CPT | Performed by: INTERNAL MEDICINE

## 2021-01-01 PROCEDURE — 33228 REMV&REPLC PM GEN DUAL LEAD: CPT | Performed by: INTERNAL MEDICINE

## 2021-01-01 PROCEDURE — 87635 SARS-COV-2 COVID-19 AMP PRB: CPT | Performed by: OBSTETRICS & GYNECOLOGY

## 2021-01-01 PROCEDURE — 25010000003 CEFAZOLIN IN DEXTROSE 2-4 GM/100ML-% SOLUTION: Performed by: INTERNAL MEDICINE

## 2021-01-01 DEVICE — PACEMAKER
Type: IMPLANTABLE DEVICE | Status: FUNCTIONAL
Brand: ACCOLADE™ MRI DR

## 2021-01-01 RX ORDER — EPINEPHRINE 0.1 MG/ML
SYRINGE (ML) INJECTION
Status: COMPLETED | OUTPATIENT
Start: 2021-01-01 | End: 2021-01-01

## 2021-01-01 RX ORDER — LIDOCAINE HYDROCHLORIDE 10 MG/ML
INJECTION, SOLUTION INFILTRATION; PERINEURAL AS NEEDED
Status: DISCONTINUED | OUTPATIENT
Start: 2021-01-01 | End: 2021-01-01 | Stop reason: HOSPADM

## 2021-01-01 RX ORDER — SODIUM CHLORIDE 0.9 % (FLUSH) 0.9 %
3 SYRINGE (ML) INJECTION EVERY 12 HOURS SCHEDULED
Status: DISCONTINUED | OUTPATIENT
Start: 2021-01-01 | End: 2021-01-01 | Stop reason: HOSPADM

## 2021-01-01 RX ORDER — LIDOCAINE HYDROCHLORIDE 10 MG/ML
0.1 INJECTION, SOLUTION EPIDURAL; INFILTRATION; INTRACAUDAL; PERINEURAL ONCE AS NEEDED
Status: DISCONTINUED | OUTPATIENT
Start: 2021-01-01 | End: 2021-01-01 | Stop reason: HOSPADM

## 2021-01-01 RX ORDER — HYDROCODONE BITARTRATE AND ACETAMINOPHEN 10; 325 MG/1; MG/1
1 TABLET ORAL EVERY 6 HOURS PRN
COMMUNITY

## 2021-01-01 RX ORDER — ATENOLOL 100 MG/1
TABLET ORAL
Qty: 90 TABLET | Refills: 3 | Status: SHIPPED | OUTPATIENT
Start: 2021-01-01 | End: 2021-01-01 | Stop reason: SDUPTHER

## 2021-01-01 RX ORDER — CEFAZOLIN SODIUM 2 G/100ML
INJECTION, SOLUTION INTRAVENOUS CONTINUOUS PRN
Status: COMPLETED | OUTPATIENT
Start: 2021-01-01 | End: 2021-01-01

## 2021-01-01 RX ORDER — FENTANYL CITRATE 50 UG/ML
INJECTION, SOLUTION INTRAMUSCULAR; INTRAVENOUS AS NEEDED
Status: DISCONTINUED | OUTPATIENT
Start: 2021-01-01 | End: 2021-01-01 | Stop reason: HOSPADM

## 2021-01-01 RX ORDER — SODIUM CHLORIDE 0.9 % (FLUSH) 0.9 %
10 SYRINGE (ML) INJECTION AS NEEDED
Status: DISCONTINUED | OUTPATIENT
Start: 2021-01-01 | End: 2021-01-01 | Stop reason: HOSPADM

## 2021-01-01 RX ORDER — ATENOLOL 100 MG/1
100 TABLET ORAL DAILY
Qty: 90 TABLET | Refills: 3 | Status: SHIPPED | OUTPATIENT
Start: 2021-01-01

## 2021-01-01 RX ORDER — MIDAZOLAM HYDROCHLORIDE 1 MG/ML
INJECTION INTRAMUSCULAR; INTRAVENOUS AS NEEDED
Status: DISCONTINUED | OUTPATIENT
Start: 2021-01-01 | End: 2021-01-01 | Stop reason: HOSPADM

## 2021-01-01 RX ORDER — SODIUM CHLORIDE 9 MG/ML
75 INJECTION, SOLUTION INTRAVENOUS CONTINUOUS
Status: DISCONTINUED | OUTPATIENT
Start: 2021-01-01 | End: 2021-01-01 | Stop reason: HOSPADM

## 2021-01-01 RX ADMIN — EPINEPHRINE 1 MG: 0.1 INJECTION INTRACARDIAC; INTRAVENOUS at 05:10

## 2021-01-01 RX ADMIN — SODIUM CHLORIDE 75 ML/HR: 9 INJECTION, SOLUTION INTRAVENOUS at 10:09

## 2021-01-01 RX ADMIN — EPINEPHRINE 1 MG: 0.1 INJECTION INTRACARDIAC; INTRAVENOUS at 05:07

## 2021-01-13 NOTE — PROGRESS NOTES
Date of Office Visit: 2021  Encounter Provider: TANVI Ochoa  Place of Service: [facility] CARDIOLOGY  Patient Name: Felipe Lucas  :1945  Primary Cardiologist: Dr. Alarcon    CC:  4 month Follow up    Dear Dr. Andres    HPI: Felipe Lucas is a pleasant 75 y.o. male who presents 2021 for cardiac follow up. I have reviewed his past medical records, labs and testing.    He has a history of complete AV block and syncope and had a pacemaker implanted  (dual-chamber [employer]). He has had no events since then and we have been checking his pacemaker in our device clinic in [place].      When seen  in 2015, he noted significant shortness of breath with exertion. A nuclear stress test and an echocardiogram were recommended,  but he declined due to cost. When seen again in 2016, he again noted these symptoms and again recommended testing, but it was declined due to cost.       Atenolol  Was started in  for hypertension and anxiety.       His PCP had  started chlorthalidone for worsening hypertension.  Then he had to start oral potassium, and he hates the liquid.  He also has had terrible gout since then. This was stopped by Dr. Alarcon and he was started on 5 mg amlodipine.    He states he did take the amlodipine for 2 days and felt really poorly so he stopped it altogether.  He has only been taking the atenolol daily.  His wife is a good historian and caretaker and states his blood pressures are usually no more than 120 and sometimes a little less than that systolic.  His gout has been controlled.  His biggest concern is his pacemaker.  He knows it is getting to the end-of-life stage.  I did review his last remote check from 12/3/2020.  This showed normal function no events a battery life of less than 6 months.  It was also noted on there that his batteries been reading less than 6 months since 2020.  He does have an office  check on 1/21/2021.  He denies any shortness of breath, lower extremity edema, palpitations, chest pain or chest pressure.  He has not had any dizziness or lightheadedness.  He has not had any syncope or presyncopal episodes.  He has not fallen for any reason.  He denies any fatigue.  He does admit to being quite sedentary throughout the whole pandemic.  He denies any snoring, PND, cough, orthopnea.  He has not had any unexplained bleeding, dark or tarry stools.    Past Medical History:   Diagnosis Date   • Chronic anxiety    • Chronic lower back pain    • Complete atrioventricular block (CMS/HCC)     s/p dual chamber pacemaker   • Gout    • Hypertension        Past Surgical History:   Procedure Laterality Date   • INSERT / REPLACE / REMOVE PACEMAKER     • MULTIPLE TOOTH EXTRACTIONS     • PACEMAKER IMPLANTATION      [employer] dual chamber, 11/2011       Social History     Socioeconomic History   • Marital status:      Spouse name: Not on file   • Number of children: Not on file   • Years of education: Not on file   • Highest education level: Not on file   Tobacco Use   • Smoking status: Former Smoker     Start date: 5/30/1997   • Smokeless tobacco: Never Used   • Tobacco comment: CAFFEINE USE/ ONE CUP IN THE MORNING.    Substance and Sexual Activity   • Alcohol use: Yes     Binge frequency: Less than monthly     Comment: SOCIAL USE    • Drug use: No   • Sexual activity: Defer       No family history on file.    The following portion of the patient's history were reviewed and updated as appropriate: past medical history, past surgical history, past social history, past family history, allergies, current medications, and problem list.    Review of Systems   Constitution: Negative for diaphoresis, fever and malaise/fatigue.   HENT: Negative for congestion, hearing loss, hoarse voice, nosebleeds and sore throat.    Eyes: Negative for photophobia, vision loss in left eye, vision loss in right eye and visual  disturbance.   Cardiovascular: Negative for chest pain, dyspnea on exertion, irregular heartbeat, leg swelling, near-syncope, orthopnea, palpitations, paroxysmal nocturnal dyspnea and syncope.   Respiratory: Negative for cough, hemoptysis, shortness of breath, sleep disturbances due to breathing, snoring, sputum production and wheezing.    Endocrine: Negative for cold intolerance, heat intolerance, polydipsia, polyphagia and polyuria.   Hematologic/Lymphatic: Negative for bleeding problem. Does not bruise/bleed easily.   Skin: Negative for color change, dry skin, poor wound healing, rash and suspicious lesions.   Musculoskeletal: Negative for arthritis, back pain, falls, gout, joint pain, joint swelling, muscle cramps, muscle weakness and myalgias.   Gastrointestinal: Negative for bloating, abdominal pain, constipation, diarrhea, dysphagia, melena, nausea and vomiting.   Neurological: Negative for excessive daytime sleepiness, dizziness, headaches, light-headedness, loss of balance, numbness, paresthesias, seizures, vertigo and weakness.   Psychiatric/Behavioral: Negative for depression, memory loss and substance abuse. The patient is not nervous/anxious.        Allergies   Allergen Reactions   • Sertraline Other (See Comments)     Makes heart race   • Meloxicam Unknown (See Comments)     GI upset         Current Outpatient Medications:   •  amLODIPine (NORVASC) 5 MG tablet, Take 1 tablet by mouth Daily., Disp: 90 tablet, Rfl: 1  •  aspirin 81 MG chewable tablet, Chew 81 mg Daily., Disp: , Rfl:   •  atenolol (TENORMIN) 100 MG tablet, TAKE ONE TABLET BY MOUTH DAILY, Disp: 90 tablet, Rfl: 3  •  chlorthalidone (HYGROTON) 25 MG tablet, TAKE ONE TABLET BY MOUTH DAILY, Disp: 30 tablet, Rfl: 3  •  colchicine 0.6 MG tablet, Take 2 tabs now and then take 1 tab in 1 hour. Repeat daily as needed, Disp: 12 tablet, Rfl: 0  •  HYDROcodone-acetaminophen (NORCO)  MG per tablet, 3 (Three) Times a Day., Disp: , Rfl:   •   omeprazole (priLOSEC) 20 MG capsule, Take 20 mg by mouth Daily., Disp: , Rfl:         Objective:     There were no vitals filed for this visit.  There is no height or weight on file to calculate BMI.      Vitals signs reviewed.   Constitutional:       General: Not in acute distress.     Appearance: Not in distress. Obese.   Eyes:      General:         Right eye: No discharge.         Left eye: No discharge.      Conjunctiva/sclera: Conjunctivae normal.   HENT:      Head: Normocephalic and atraumatic.      Right Ear: External ear normal.      Left Ear: External ear normal.      Nose: Nose normal.   Neck:      Musculoskeletal: Normal range of motion and neck supple.      Thyroid: No thyromegaly.      Vascular: No JVD.      Trachea: No tracheal deviation.      Lymphadenopathy: No cervical adenopathy.   Pulmonary:      Effort: Pulmonary effort is normal. No respiratory distress.      Breath sounds: Normal breath sounds. No wheezing. No rales.   Chest:      Chest wall: Not tender to palpatation.   Cardiovascular:      Normal rate. Regular rhythm.      No gallop.   Pulses:     Intact distal pulses.   Abdominal:      General: There is no distension.      Palpations: Abdomen is soft.      Tenderness: There is no abdominal tenderness.   Musculoskeletal: Normal range of motion.         General: No tenderness or deformity.   Skin:     General: Skin is warm and dry.      Findings: No erythema or rash.   Neurological:      Mental Status: Alert and oriented to person, place, and time.      Coordination: Coordination normal.   Psychiatric:         Behavior: Behavior normal. Behavior is cooperative.         Thought Content: Thought content normal.         Judgment: Judgment normal.               ECG 12 Lead    Date/Time: 1/13/2021 1:18 PM  Performed by: Megan Wolf APRN  Authorized by: Megan Wolf APRN   Comparison: compared with previous ECG from 9/29/2020  Similar to previous ECG  Rhythm: paced  Rate: normal  QRS axis:  left  Pacing: atrial sensed rhythm and ventricular paced rhythm  Clinical impression: abnormal EKG              Assessment:       Diagnosis Plan   1. Complete atrioventricular block (CMS/HCC)     2. History of pacemaker     3. Essential hypertension            Plan:              1/2.  Complete atrioventricular block/pacemaker - followed in our device clinic. Last remote check 12/3/2020.  Reviewed.  Office check 1/21/21     3.  HTN -  He only took the amlodipine for 2 days and it made him feel poorly.  He has not taken it any more.  Home  or less systolic and very consistent.  Will continue with atenolol.    RTO in 6 months with JK      As always, it has been a pleasure to participate in your patient's care. Thank you.       Sincerely,       TANVI Ochoa      Current Outpatient Medications:   •  aspirin 81 MG chewable tablet, Chew 81 mg Daily., Disp: , Rfl:   •  atenolol (TENORMIN) 100 MG tablet, TAKE ONE TABLET BY MOUTH DAILY, Disp: 90 tablet, Rfl: 3  •  colchicine 0.6 MG tablet, Take 2 tabs now and then take 1 tab in 1 hour. Repeat daily as needed, Disp: 12 tablet, Rfl: 0  •  omeprazole (priLOSEC) 20 MG capsule, Take 20 mg by mouth Daily., Disp: , Rfl:     Dictated utilizing Dragon dictation

## 2021-04-05 NOTE — DISCHARGE INSTRUCTIONS
"[facility] - [phone]  Dr. Paige    BATTERY CHANGE FOR YOUR PACEMAKER  What is a pacemaker battery change?  Your pacemaker generator has been changed because your batteries were running low.     A pacemaker has two parts -- the \"generator\" that creates the electricity that helps keep your heart beating regularly and wires that carry the electricity from the generator to your heart muscle. When your batteries start running low, your doctor or nurse can tell when they check your pacemaker during your routine pacemaker checks. The pacemaker gives several months' warning, so there is plenty of time to get the battery changed and you don't need to worry about the pacemaker running out of energy!  When your battery runs low, the doctor replaces the whole generator part of the pacemaker. The wires that carry electricity from the generator to your heart muscle aren't changed; they are just be disconnected from the old generator and hooked up to the new generator.    Replacing the pacemaker is a short outpatient surgery that takes about an hour. The doctor may have given you some medicine to make you drowsy and numb the skin over your pacemaker so you didn't feel any pain during the surgery.     You will have a small dressing over your pacemaker. You will need a responsible adult to drive you home after your procedure.    At home, please follow these instructions:  • Take your medicines as prescribed.  • Check your pulse each day and record in a diary.  • Check your incision each day until it is healed.  • Check with your health care provider before lifting heavy objects.  • Check with your health care provider before driving.  • Carry your pacemaker card with you at all times.  • Avoid strong magnetic fields such as an MRI.   • Let airport personnel know that you have a pacemaker before you go through the metal detector.    The pacemaker clinic will contact you (usually within 1 business day) to " schedule a pacemaker/incision check. The check is usually done 7-10 days post-implant. If you have not heard from the pacemaker clinic within 3 days, please call the office.    Please call the office if you experience any of the following:   bleeding or drainage from your incision   swelling, redness, or opening of your incision   fever or chills   pain not relieved with medication   chest pain or difficulty breathing   lightheadness    Contact your health care provider as soon as possible if you have any other questions or concerns.

## 2021-04-05 NOTE — H&P
Patient Name: Felipe Lucas  Age/Sex: 75 y.o. male  : 1945  MRN: [id number]    Date of Admission: 2021  Date of Encounter Visit: 21  Encounter Provider: Sravan Bucio MD  Place of Service: [facility] CARDIOLOGY      Referring Provider: Sravan Bucio MD  Patient Care Team:  Boyd Andres MD as PCP - General (Family Medicine)    Subjective:   Admitted for: Pacemaker replacement        History of Present Illness:  Felipe Lucas is a 75 y.o. male with a history of complete heart block.  The patient's pacemaker is now at [facility].  He is here for replacement.        Past Medical History:  Past Medical History:   Diagnosis Date   • Chronic anxiety    • Chronic lower back pain    • Complete atrioventricular block (CMS/HCC)     s/p dual chamber pacemaker   • Gout    • Hypertension        Past Surgical History:   Procedure Laterality Date   • INSERT / REPLACE / REMOVE PACEMAKER     • MULTIPLE TOOTH EXTRACTIONS     • PACEMAKER IMPLANTATION      [facility] dual chamber, 2011       Home Medications:   Medications Prior to Admission   Medication Sig Dispense Refill Last Dose   • aspirin 81 MG chewable tablet Chew 81 mg Daily.   2021 at Unknown time   • atenolol (TENORMIN) 100 MG tablet Take 1 tablet by mouth Daily. 90 tablet 3 2021 at Unknown time   • colchicine 0.6 MG tablet Take 2 tabs now and then take 1 tab in 1 hour. Repeat daily as needed (Patient taking differently: Take 0.6 mg by mouth. Take 2 tabs now and then take 1 tab in 1 hour. Repeat daily as needed) 12 tablet 0 Past Week at Unknown time   • HYDROcodone-acetaminophen (NORCO)  MG per tablet Take 1 tablet by mouth Every 6 (Six) Hours As Needed for Moderate Pain .   2021 at Unknown time   • omeprazole (priLOSEC) 20 MG capsule Take 20 mg by mouth Daily.   Past Week at Unknown time       Allergies:  Allergies   Allergen Reactions   • Sertraline Other (See Comments)     Makes  heart race   • Meloxicam Unknown (See Comments)     GI upset       Past Social History:  Social History     Socioeconomic History   • Marital status:      Spouse name: Not on file   • Number of children: Not on file   • Years of education: Not on file   • Highest education level: Not on file   Tobacco Use   • Smoking status: Former Smoker     Start date: 5/30/1997   • Smokeless tobacco: Never Used   • Tobacco comment: CAFFEINE USE/ ONE CUP IN THE MORNING.    Substance and Sexual Activity   • Alcohol use: Yes     Comment: SOCIAL USE    • Drug use: No   • Sexual activity: Defer        Past Family History:  No family history on file.    Review of Systems: All systems reviewed. Pertinent positives identified in HPI. All other systems are negative.     REVIEW OF SYSTEMS:   CONSTITUTIONAL: No weight loss, fever, chills, weakness or fatigue.   HEENT: Eyes: No visual loss, blurred vision, double vision or yellow sclerae. Ears, Nose, Throat: No hearing loss, sneezing, congestion, runny nose or sore throat.   SKIN: No rash or itching.     RESPIRATORY: No shortness of breath, hemoptysis, cough or sputum.   GASTROINTESTINAL: No anorexia, nausea, vomiting or diarrhea. No abdominal pain, bright red blood per rectum or melena.  GENITOURINARY: No burning on urination, hematuria or increased frequency.  NEUROLOGICAL: No headache, dizziness, syncope, paralysis, ataxia, numbness or tingling in the extremities. No change in bowel or bladder control.   MUSCULOSKELETAL: No muscle, back pain, joint pain or stiffness.   HEMATOLOGIC: No anemia, bleeding or bruising.   LYMPHATICS: No enlarged nodes. No history of splenectomy.   PSYCHIATRIC: No history of depression, anxiety, hallucinations.   ENDOCRINOLOGIC: No reports of sweating, cold or heat intolerance. No polyuria or polydipsia.       Objective:     Objective:     No intake or output data in the 24 hours ending 04/05/21 0956  Body mass index is 31.19 kg/m².      04/05/21  0978    Weight: 104 kg (230 lb)           Physical Exam:   Constitutional:       Appearance: Not in distress.   Cardiovascular:      Normal rate. Normal S1. Normal S2.           Lab Review:                                                 Imaging:      Imaging Results (Most Recent)     None          EKG:         Baseline:     I personally viewed and interpreted the patient's EKG/Telemetry data.    Assessment:   Assessment/Plan         Complete atrioventricular block (CMS/HCC)            Plan:      Pacemaker replacement    Thank you for allowing me to participate in the care of Felipe Lucas. Feel free to contact me directly with any further questions or concerns.    Sravan Bucio MD  [facility]  04/05/21  09:56 EDT

## 2021-06-22 NOTE — PROGRESS NOTES
Date of Office Visit: 2021  Encounter Provider: TANVI Ochoa  Place of Service: [facility] CARDIOLOGY  Patient Name: Felipe Lucas  :1945  Primary Cardiologist: Dr. Alarcon    CC:  Chest pain    Dear Dr. Andres    HPI: Felipe Lucas is a pleasant 75 y.o. male who presents 2021 for cardiac follow up. I have reviewed his past medical records including notes,labsand testing in preparation for today's visit.    He has a history of complete AV block and syncope and had a pacemaker implanted  (dual-chamber [employer]). He has had no events since then and we have been checking his pacemaker in our device clinic in [place].      When seen  in 2015, he noted significant shortness of breath with exertion. A nuclear stress test and an echocardiogram were recommended,  but he declined due to cost. When seen again in 2016, he again noted these symptoms and again recommended testing, but it was declined due to cost.        Atenolol  Was started in  for hypertension and anxiety.       His PCP had  started chlorthalidone for worsening hypertension.  Then he had to start oral potassium, and he hates the liquid.  He also has had terrible gout since then. This was stopped by Dr. Alarcon and he was started on 5 mg amlodipine.     I saw him in 2021.  He states he only took amlodipine for 2 days because he felt very poorly.  His biggest concern was his pacemaker.  He knew that the battery was getting to end-of-life.  He is followed in our device clinic.  He had recently had his device checked the prior month in 2020.    He had a new generator, [employer] model number L3 1 1, serial #[id number] on 2021.    He presents today because he and his wife state that he has been having some episodic chest pain that normally happens at night, waking him up at night.  He describes it as an ache.  It can last up to an hour.  Nothing  seems to help however he does use a heating pad at times takes extra aspirin or his pain pill.  Again he states this really does not help the pain go away but it does relax him.  He states this is happening several times a week.  He denies any palpitations, lower extremity edema, dizziness or lightheadedness.  He could not really tell me if he felt short of breath.  His wife states that he rarely snores.  He denies any PND, cough, orthopnea.  He does complain of fatigue.  He does rely on his wife to verify answers that he is unsure of.    Past Medical History:   Diagnosis Date   • Chronic anxiety    • Chronic lower back pain    • Complete atrioventricular block (CMS/HCC)     s/p dual chamber pacemaker   • Gout    • Hypertension        Past Surgical History:   Procedure Laterality Date   • CARDIAC ELECTROPHYSIOLOGY PROCEDURE N/A 4/5/2021    Procedure: PPM generator change - dual [place];  Surgeon: Sravan Bucio MD;  Location: [facility] INVASIVE LOCATION;  Service: Cardiology;  Laterality: N/A;   • INSERT / REPLACE / REMOVE PACEMAKER     • MULTIPLE TOOTH EXTRACTIONS     • PACEMAKER IMPLANTATION      [employer] dual chamber, 11/2011       Social History     Socioeconomic History   • Marital status:      Spouse name: Not on file   • Number of children: Not on file   • Years of education: Not on file   • Highest education level: Not on file   Tobacco Use   • Smoking status: Former Smoker     Start date: 5/30/1997   • Smokeless tobacco: Never Used   • Tobacco comment: CAFFEINE USE/ ONE CUP IN THE MORNING.    Substance and Sexual Activity   • Alcohol use: Yes     Comment: SOCIAL USE    • Drug use: No   • Sexual activity: Defer       History reviewed. No pertinent family history.    The following portion of the patient's history were reviewed and updated as appropriate: past medical history, past surgical history, past social history, past family history, allergies, current medications, and problem list.    Review  of Systems   Constitutional: Positive for malaise/fatigue. Negative for diaphoresis and fever.   HENT: Negative for congestion, hearing loss, hoarse voice, nosebleeds and sore throat.    Eyes: Negative for photophobia, vision loss in left eye, vision loss in right eye and visual disturbance.   Cardiovascular: Positive for chest pain (achy, wakes him up at night.). Negative for dyspnea on exertion, irregular heartbeat, leg swelling, near-syncope, orthopnea, palpitations, paroxysmal nocturnal dyspnea and syncope.   Respiratory: Negative for cough, hemoptysis, shortness of breath, sleep disturbances due to breathing, snoring, sputum production and wheezing.    Endocrine: Negative for cold intolerance, heat intolerance, polydipsia, polyphagia and polyuria.   Hematologic/Lymphatic: Negative for bleeding problem. Does not bruise/bleed easily.   Skin: Negative for color change, dry skin, poor wound healing, rash and suspicious lesions.   Musculoskeletal: Negative for arthritis, back pain, falls, gout, joint pain, joint swelling, muscle cramps, muscle weakness and myalgias.   Gastrointestinal: Negative for bloating, abdominal pain, constipation, diarrhea, dysphagia, melena, nausea and vomiting.   Neurological: Negative for excessive daytime sleepiness, dizziness, headaches, light-headedness, loss of balance, numbness, paresthesias, seizures, vertigo and weakness.   Psychiatric/Behavioral: Negative for depression, memory loss and substance abuse. The patient is not nervous/anxious.        Allergies   Allergen Reactions   • Sertraline Other (See Comments)     Makes heart race   • Meloxicam Unknown (See Comments)     GI upset         Current Outpatient Medications:   •  aspirin 81 MG chewable tablet, Chew 81 mg Daily., Disp: , Rfl:   •  atenolol (TENORMIN) 100 MG tablet, Take 1 tablet by mouth Daily., Disp: 90 tablet, Rfl: 3  •  colchicine 0.6 MG tablet, Take 2 tabs now and then take 1 tab in 1 hour. Repeat daily as needed  "(Patient taking differently: Take 0.6 mg by mouth. Take 2 tabs now and then take 1 tab in 1 hour. Repeat daily as needed), Disp: 12 tablet, Rfl: 0  •  HYDROcodone-acetaminophen (NORCO)  MG per tablet, Take 1 tablet by mouth Every 6 (Six) Hours As Needed for Moderate Pain ., Disp: , Rfl:   •  omeprazole (priLOSEC) 20 MG capsule, Take 20 mg by mouth Daily., Disp: , Rfl:         Objective:     Vitals:    06/22/21 1429   BP: 110/70   Pulse: 73   Resp: 16   SpO2: 96%   Weight: 126 kg (277 lb)   Height: 182.9 cm (72\")     Body mass index is 37.57 kg/m².      Vitals reviewed.   Constitutional:       General: Not in acute distress.     Appearance: Well-developed and not in distress. Morbidly obese.   Eyes:      General:         Right eye: No discharge.         Left eye: No discharge.      Conjunctiva/sclera: Conjunctivae normal.   HENT:      Head: Normocephalic and atraumatic.      Right Ear: External ear normal.      Left Ear: External ear normal.      Nose: Nose normal.   Neck:      Thyroid: No thyromegaly.      Vascular: No JVD.      Trachea: No tracheal deviation.      Lymphadenopathy: No cervical adenopathy.   Pulmonary:      Effort: Pulmonary effort is normal. No respiratory distress.      Breath sounds: Normal breath sounds. No wheezing. No rales.   Chest:      Chest wall: Not tender to palpatation.   Cardiovascular:      Normal rate. Regular rhythm.      No gallop.   Pulses:     Intact distal pulses.   Edema:     Peripheral edema absent.   Abdominal:      General: There is no distension.      Palpations: Abdomen is soft.      Tenderness: There is no abdominal tenderness.   Musculoskeletal: Normal range of motion.         General: No tenderness or deformity.      Cervical back: Normal range of motion and neck supple. Skin:     General: Skin is warm and dry.      Findings: No erythema or rash.   Neurological:      Mental Status: Alert and oriented to person, place, and time.      Coordination: Coordination " normal.   Psychiatric:         Attention and Perception: Attention normal.         Mood and Affect: Mood normal.         Speech: Speech normal.         Behavior: Behavior normal.         Thought Content: Thought content normal.         Cognition and Memory: Memory is impaired.               ECG 12 Lead    Date/Time: 6/22/2021 4:25 PM  Performed by: Megan Wolf APRN  Authorized by: Megan Wolf APRN   Comparison: compared with previous ECG from 1/13/2021  Similar to previous ECG  Rhythm: sinus rhythm and paced  Rate: normal  Conduction: conduction normal  QRS axis: left  Pacing: ventricular paced rhythm  Clinical impression: abnormal EKG              Assessment:       Diagnosis Plan   1. Complete atrioventricular block (CMS/HCC)     2. History of pacemaker     3. Essential hypertension     4. Precordial pain  Stress Test With Myocardial Perfusion One Day          Plan:           1/2.  Complete atrioventricular block/pacemaker - He had a generator change 4/5/2021.  He does not know when his next in patient or remote check is due.  Note to device clinic.     3.  HTN -  Well controlled    4.  Chest pain - check Lexiscan    Check Lexiscan    As always, it has been a pleasure to participate in your patient's care. Thank you.       Sincerely,       TANVI Ochoa      Current Outpatient Medications:   •  aspirin 81 MG chewable tablet, Chew 81 mg Daily., Disp: , Rfl:   •  atenolol (TENORMIN) 100 MG tablet, Take 1 tablet by mouth Daily., Disp: 90 tablet, Rfl: 3  •  colchicine 0.6 MG tablet, Take 2 tabs now and then take 1 tab in 1 hour. Repeat daily as needed (Patient taking differently: Take 0.6 mg by mouth. Take 2 tabs now and then take 1 tab in 1 hour. Repeat daily as needed), Disp: 12 tablet, Rfl: 0  •  HYDROcodone-acetaminophen (NORCO)  MG per tablet, Take 1 tablet by mouth Every 6 (Six) Hours As Needed for Moderate Pain ., Disp: , Rfl:   •  omeprazole (priLOSEC) 20 MG capsule, Take 20 mg by mouth Daily.,  Disp: , Rfl:     Dictated utilizing Dragon dictation

## 2021-06-30 ENCOUNTER — APPOINTMENT (OUTPATIENT)
Dept: NUCLEAR MEDICINE | Facility: HOSPITAL | Age: 76
End: 2021-06-30

## (undated) DEVICE — SUREFIT, DUAL DISPERSIVE ELECTRODE, CONTACT QUALITY MONITOR: Brand: SUREFIT

## (undated) DEVICE — BIDIRECTIONAL TORQUE WRENCH NO2: Brand: MODEL 6942 BIDIRECTIONAL TORQUE WRENCH

## (undated) DEVICE — SKIN PREP TRAY W/CHG: Brand: MEDLINE INDUSTRIES, INC.

## (undated) DEVICE — LOU PACE DEFIB: Brand: MEDLINE INDUSTRIES, INC.

## (undated) DEVICE — Device

## (undated) DEVICE — DRSNG SURESITE WNDW 4X4.5

## (undated) DEVICE — PLASMABLADE PS200-040 4.0: Brand: PLASMABLADE™